# Patient Record
Sex: MALE | Race: WHITE | NOT HISPANIC OR LATINO | Employment: FULL TIME
[De-identification: names, ages, dates, MRNs, and addresses within clinical notes are randomized per-mention and may not be internally consistent; named-entity substitution may affect disease eponyms.]

---

## 2019-10-16 ENCOUNTER — TRANSCRIBE ORDERS (OUTPATIENT)
Dept: SCHEDULING | Age: 49
End: 2019-10-16

## 2019-10-16 DIAGNOSIS — E78.00 PURE HYPERCHOLESTEROLEMIA, UNSPECIFIED: Primary | ICD-10-CM

## 2019-10-23 ENCOUNTER — HOSPITAL ENCOUNTER (OUTPATIENT)
Dept: RADIOLOGY | Age: 49
Discharge: HOME | End: 2019-10-23
Attending: INTERNAL MEDICINE

## 2019-10-23 DIAGNOSIS — E78.00 PURE HYPERCHOLESTEROLEMIA, UNSPECIFIED: ICD-10-CM

## 2019-10-23 PROCEDURE — 75571 CT HRT W/O DYE W/CA TEST: CPT

## 2019-12-14 ENCOUNTER — EMERGENCY (EMERGENCY)
Facility: HOSPITAL | Age: 49
LOS: 1 days | Discharge: HOME | End: 2019-12-14
Attending: EMERGENCY MEDICINE
Payer: COMMERCIAL

## 2019-12-14 VITALS
OXYGEN SATURATION: 99 % | RESPIRATION RATE: 17 BRPM | HEART RATE: 95 BPM | WEIGHT: 139.99 LBS | SYSTOLIC BLOOD PRESSURE: 153 MMHG | DIASTOLIC BLOOD PRESSURE: 88 MMHG | TEMPERATURE: 98 F

## 2019-12-14 PROCEDURE — 99283 EMERGENCY DEPT VISIT LOW MDM: CPT

## 2019-12-14 PROCEDURE — 73140 X-RAY EXAM OF FINGER(S): CPT

## 2019-12-14 PROCEDURE — 90715 TDAP VACCINE 7 YRS/> IM: CPT

## 2019-12-14 PROCEDURE — 99283 EMERGENCY DEPT VISIT LOW MDM: CPT | Mod: 25

## 2019-12-14 PROCEDURE — 73140 X-RAY EXAM OF FINGER(S): CPT | Mod: 26,LT

## 2019-12-14 PROCEDURE — 90471 IMMUNIZATION ADMIN: CPT

## 2019-12-14 RX ORDER — IBUPROFEN 200 MG
600 TABLET ORAL ONCE
Refills: 0 | Status: COMPLETED | OUTPATIENT
Start: 2019-12-14 | End: 2019-12-14

## 2019-12-14 RX ORDER — CEPHALEXIN 500 MG
1 CAPSULE ORAL
Qty: 21 | Refills: 0
Start: 2019-12-14 | End: 2019-12-20

## 2019-12-14 RX ORDER — OXYCODONE HYDROCHLORIDE 5 MG/1
5 TABLET ORAL ONCE
Refills: 0 | Status: DISCONTINUED | OUTPATIENT
Start: 2019-12-14 | End: 2019-12-14

## 2019-12-14 RX ORDER — ACETAMINOPHEN 500 MG
975 TABLET ORAL ONCE
Refills: 0 | Status: COMPLETED | OUTPATIENT
Start: 2019-12-14 | End: 2019-12-14

## 2019-12-14 RX ORDER — TETANUS TOXOID, REDUCED DIPHTHERIA TOXOID AND ACELLULAR PERTUSSIS VACCINE, ADSORBED 5; 2.5; 8; 8; 2.5 [IU]/.5ML; [IU]/.5ML; UG/.5ML; UG/.5ML; UG/.5ML
0.5 SUSPENSION INTRAMUSCULAR ONCE
Refills: 0 | Status: COMPLETED | OUTPATIENT
Start: 2019-12-14 | End: 2019-12-14

## 2019-12-14 RX ADMIN — TETANUS TOXOID, REDUCED DIPHTHERIA TOXOID AND ACELLULAR PERTUSSIS VACCINE, ADSORBED 0.5 MILLILITER(S): 5; 2.5; 8; 8; 2.5 SUSPENSION INTRAMUSCULAR at 20:47

## 2019-12-14 RX ADMIN — Medication 600 MILLIGRAM(S): at 20:39

## 2019-12-14 RX ADMIN — Medication 975 MILLIGRAM(S): at 20:39

## 2019-12-14 RX ADMIN — Medication 1 TABLET(S): at 20:50

## 2019-12-14 NOTE — ED ADULT TRIAGE NOTE - CHIEF COMPLAINT QUOTE
Patient c/o left index finger pain and bleeding for 30 minutes. Patient cut his finger with a chair.

## 2019-12-14 NOTE — ED PROVIDER NOTE - OBJECTIVE STATEMENT
48yo male pt with PMHx of HLD, ambulatory c/o left non dominant index finger tip amputation s/p crushing injury this evening. Pt stated his finger was caught by a chair. Denies other injuries. Denies sensory changes or weakness to fingers. Denies fever, chills ot recent sickness. Unknown last Tdap.

## 2019-12-14 NOTE — ED PROVIDER NOTE - ATTENDING CONTRIBUTION TO CARE
------------ATTENDING NOTE------------  RHD pt w/ family c/o accidental injury to L index (2nd) fingertip, exam w/ simple fat pad amputation and radiograph w/ small tuft fx, no additional injuries, pt/family requesting hand for repair (vs BS dressing), nml VS at CT, in depth dw all about ddx, tx, barajas, continued close outpt fu.  - Randy Dvaid MD   ---------------------------------------------- ------------ATTENDING NOTE------------  RHD pt w/ family c/o accidental injury to L index (2nd) fingertip, exam w/ simple fat pad amputation and radiograph w/ small tuft fx, no additional injuries, pt/family requesting hand for repair (vs BS dressing and splint and close outpt Hand fu), nml VS at [place], in depth dw all about ddx, tx, barajas, continued close outpt fu.  - Randy David MD   ----------------------------------------------

## 2019-12-14 NOTE — ED PROVIDER NOTE - PATIENT PORTAL LINK FT
You can access the FollowMyHealth Patient Portal offered by [facility] by registering at the following website: [email or web address]. By joining [employer]’s FollowMyHealth portal, you will also be able to view your health information using other applications (apps) compatible with our system.

## 2019-12-14 NOTE — ED PROVIDER NOTE - NSFOLLOWUPINSTRUCTIONS_ED_ALL_ED_FT
See hand specialist Dr. Gonzalez as scheduled -- call to discuss.    Acetaminophen as directed for pain -- see medication warnings.  Ibuprofen as directed for pain--- see medication warnings.  Augmentin as directed.    See laceration information and return instructions given to you.    Seek immediate medical care for new/worsening symptoms/concerns. See hand specialist Dr. Gonzalez as scheduled -- call to discuss.    Acetaminophen as directed for pain -- see medication warnings.  Ibuprofen as directed for pain--- see medication warnings.  Keflex as directed.    See laceration information and return instructions given to you.    Seek immediate medical care for new/worsening symptoms/concerns.

## 2019-12-14 NOTE — ED PROVIDER NOTE - PHYSICAL EXAMINATION
NAD, VSS, Afebrile, Lungs clear. + Left 2nd phalanx; tuft amputation without active bleeding. N/V- intact.

## 2019-12-14 NOTE — ED PROVIDER NOTE - CARE PROVIDER_API CALL
Eulogio Gonzalez)  Plastic Surgery  [address]  [place] [address]  Phone: [phone]  Fax: [phone]  Follow Up Time:

## 2019-12-14 NOTE — ED ADULT NURSE NOTE - OBJECTIVE STATEMENT
49 y.o M presents to the ED for finger injury. Patient is awake, alert and speaking coherently. As per patient he was at a restaurant and reached underneath a chair and cut a piece of his left pointer finger off. Patient presents with gauze wrapped around finger, actively bleeding. Finger tip that was cut off is in cup of ice water at the bedside. Patient reports anxiety and discomfort- pain rated 7/10.

## 2023-06-12 ENCOUNTER — APPOINTMENT (EMERGENCY)
Dept: RADIOLOGY | Facility: HOSPITAL | Age: 53
End: 2023-06-12
Payer: COMMERCIAL

## 2023-06-12 ENCOUNTER — HOSPITAL ENCOUNTER (EMERGENCY)
Facility: HOSPITAL | Age: 53
Discharge: HOME | End: 2023-06-13
Attending: STUDENT IN AN ORGANIZED HEALTH CARE EDUCATION/TRAINING PROGRAM
Payer: COMMERCIAL

## 2023-06-12 DIAGNOSIS — N20.1 CALCULUS OF URETEROVESICAL JUNCTION (UVJ): Primary | ICD-10-CM

## 2023-06-12 LAB
ALBUMIN SERPL-MCNC: 4.3 G/DL (ref 3.4–5)
ALP SERPL-CCNC: 95 IU/L (ref 35–126)
ALT SERPL-CCNC: 29 IU/L (ref 16–63)
ANION GAP SERPL CALC-SCNC: 8 MEQ/L (ref 3–15)
AST SERPL-CCNC: 25 IU/L (ref 15–41)
BASOPHILS # BLD: 0.04 K/UL (ref 0.01–0.1)
BASOPHILS NFR BLD: 0.3 %
BILIRUB SERPL-MCNC: 0.8 MG/DL (ref 0.3–1.2)
BUN SERPL-MCNC: 20 MG/DL (ref 8–20)
CALCIUM SERPL-MCNC: 9.2 MG/DL (ref 8.9–10.3)
CHLORIDE SERPL-SCNC: 104 MEQ/L (ref 98–109)
CO2 SERPL-SCNC: 26 MEQ/L (ref 22–32)
CREAT SERPL-MCNC: 1.1 MG/DL (ref 0.8–1.3)
DIFFERENTIAL METHOD BLD: ABNORMAL
EOSINOPHIL # BLD: 0.13 K/UL (ref 0.04–0.54)
EOSINOPHIL NFR BLD: 0.9 %
ERYTHROCYTE [DISTWIDTH] IN BLOOD BY AUTOMATED COUNT: 12.8 % (ref 11.6–14.4)
GFR SERPL CREATININE-BSD FRML MDRD: >60 ML/MIN/1.73M*2
GLUCOSE SERPL-MCNC: 149 MG/DL (ref 70–99)
HCT VFR BLDCO AUTO: 42.9 % (ref 40.1–51)
HGB BLD-MCNC: 14.1 G/DL (ref 13.7–17.5)
IMM GRANULOCYTES # BLD AUTO: 0.07 K/UL (ref 0–0.08)
IMM GRANULOCYTES NFR BLD AUTO: 0.5 %
LIPASE SERPL-CCNC: 32 U/L (ref 20–51)
LYMPHOCYTES # BLD: 1.96 K/UL (ref 1.2–3.5)
LYMPHOCYTES NFR BLD: 13.4 %
MCH RBC QN AUTO: 28.7 PG (ref 28–33.2)
MCHC RBC AUTO-ENTMCNC: 32.9 G/DL (ref 32.2–36.5)
MCV RBC AUTO: 87.4 FL (ref 83–98)
MONOCYTES # BLD: 0.84 K/UL (ref 0.3–1)
MONOCYTES NFR BLD: 5.7 %
NEUTROPHILS # BLD: 11.6 K/UL (ref 1.7–7)
NEUTS SEG NFR BLD: 79.2 %
NRBC BLD-RTO: 0 %
PDW BLD AUTO: 10.6 FL (ref 9.4–12.4)
PLATELET # BLD AUTO: 271 K/UL (ref 150–350)
POTASSIUM SERPL-SCNC: 4.4 MEQ/L (ref 3.6–5.1)
PROT SERPL-MCNC: 7.9 G/DL (ref 6–8.2)
RBC # BLD AUTO: 4.91 M/UL (ref 4.5–5.8)
SODIUM SERPL-SCNC: 138 MEQ/L (ref 136–144)
WBC # BLD AUTO: 14.64 K/UL (ref 3.8–10.5)

## 2023-06-12 PROCEDURE — 85025 COMPLETE CBC W/AUTO DIFF WBC: CPT | Performed by: STUDENT IN AN ORGANIZED HEALTH CARE EDUCATION/TRAINING PROGRAM

## 2023-06-12 PROCEDURE — 63600105 HC IODINE BASED CONTRAST: Performed by: PHYSICIAN ASSISTANT

## 2023-06-12 PROCEDURE — 96374 THER/PROPH/DIAG INJ IV PUSH: CPT | Mod: 59

## 2023-06-12 PROCEDURE — 80053 COMPREHEN METABOLIC PANEL: CPT

## 2023-06-12 PROCEDURE — 85025 COMPLETE CBC W/AUTO DIFF WBC: CPT

## 2023-06-12 PROCEDURE — 25800000 HC PHARMACY IV SOLUTIONS: Performed by: PHYSICIAN ASSISTANT

## 2023-06-12 PROCEDURE — 74177 CT ABD & PELVIS W/CONTRAST: CPT | Mod: ME

## 2023-06-12 PROCEDURE — 3E0333Z INTRODUCTION OF ANTI-INFLAMMATORY INTO PERIPHERAL VEIN, PERCUTANEOUS APPROACH: ICD-10-PCS | Performed by: STUDENT IN AN ORGANIZED HEALTH CARE EDUCATION/TRAINING PROGRAM

## 2023-06-12 PROCEDURE — 83690 ASSAY OF LIPASE: CPT | Performed by: STUDENT IN AN ORGANIZED HEALTH CARE EDUCATION/TRAINING PROGRAM

## 2023-06-12 PROCEDURE — 99284 EMERGENCY DEPT VISIT MOD MDM: CPT | Mod: 25

## 2023-06-12 PROCEDURE — G1004 CDSM NDSC: HCPCS

## 2023-06-12 PROCEDURE — 81001 URINALYSIS AUTO W/SCOPE: CPT | Performed by: STUDENT IN AN ORGANIZED HEALTH CARE EDUCATION/TRAINING PROGRAM

## 2023-06-12 PROCEDURE — 63600000 HC DRUGS/DETAIL CODE: Performed by: STUDENT IN AN ORGANIZED HEALTH CARE EDUCATION/TRAINING PROGRAM

## 2023-06-12 PROCEDURE — 3E0337Z INTRODUCTION OF ELECTROLYTIC AND WATER BALANCE SUBSTANCE INTO PERIPHERAL VEIN, PERCUTANEOUS APPROACH: ICD-10-PCS | Performed by: STUDENT IN AN ORGANIZED HEALTH CARE EDUCATION/TRAINING PROGRAM

## 2023-06-12 PROCEDURE — 80053 COMPREHEN METABOLIC PANEL: CPT | Performed by: STUDENT IN AN ORGANIZED HEALTH CARE EDUCATION/TRAINING PROGRAM

## 2023-06-12 PROCEDURE — 36415 COLL VENOUS BLD VENIPUNCTURE: CPT

## 2023-06-12 PROCEDURE — 83690 ASSAY OF LIPASE: CPT

## 2023-06-12 PROCEDURE — 96361 HYDRATE IV INFUSION ADD-ON: CPT

## 2023-06-12 RX ORDER — ESCITALOPRAM OXALATE 10 MG/1
10 TABLET ORAL DAILY
COMMUNITY

## 2023-06-12 RX ORDER — KETOROLAC TROMETHAMINE 15 MG/ML
15 INJECTION, SOLUTION INTRAMUSCULAR; INTRAVENOUS ONCE
Status: COMPLETED | OUTPATIENT
Start: 2023-06-12 | End: 2023-06-12

## 2023-06-12 RX ORDER — ROSUVASTATIN CALCIUM 20 MG/1
20 TABLET, COATED ORAL DAILY
COMMUNITY

## 2023-06-12 RX ADMIN — IOHEXOL 100 ML: 350 INJECTION, SOLUTION INTRAVENOUS at 22:26

## 2023-06-12 RX ADMIN — KETOROLAC TROMETHAMINE 15 MG: 15 INJECTION, SOLUTION INTRAMUSCULAR; INTRAVENOUS at 23:07

## 2023-06-12 RX ADMIN — SODIUM CHLORIDE 1000 ML: 9 INJECTION, SOLUTION INTRAVENOUS at 23:07

## 2023-06-12 ASSESSMENT — ENCOUNTER SYMPTOMS
ABDOMINAL PAIN: 1
DIZZINESS: 0
DIAPHORESIS: 0
FREQUENCY: 0
DIARRHEA: 0
FLANK PAIN: 1
LIGHT-HEADEDNESS: 0
CHILLS: 0
SHORTNESS OF BREATH: 0
DYSURIA: 0
HEMATURIA: 0
NAUSEA: 0
FEVER: 0
VOMITING: 0
ABDOMINAL DISTENTION: 0
DIFFICULTY URINATING: 0

## 2023-06-13 VITALS
OXYGEN SATURATION: 98 % | DIASTOLIC BLOOD PRESSURE: 77 MMHG | TEMPERATURE: 97.3 F | BODY MASS INDEX: 24.99 KG/M2 | RESPIRATION RATE: 16 BRPM | SYSTOLIC BLOOD PRESSURE: 141 MMHG | HEIGHT: 65 IN | WEIGHT: 150 LBS | HEART RATE: 64 BPM

## 2023-06-13 LAB
BACTERIA URNS QL MICRO: ABNORMAL /HPF
BILIRUB UR QL STRIP.AUTO: NEGATIVE MG/DL
CLARITY UR REFRACT.AUTO: CLEAR
COLOR UR AUTO: YELLOW
GLUCOSE UR STRIP.AUTO-MCNC: NEGATIVE MG/DL
HGB UR QL STRIP.AUTO: 1
HYALINE CASTS #/AREA URNS LPF: ABNORMAL /LPF
KETONES UR STRIP.AUTO-MCNC: NEGATIVE MG/DL
LEUKOCYTE ESTERASE UR QL STRIP.AUTO: NEGATIVE
MUCOUS THREADS URNS QL MICRO: ABNORMAL /LPF
NITRITE UR QL STRIP.AUTO: NEGATIVE
PH UR STRIP.AUTO: 6 [PH]
PROT UR QL STRIP.AUTO: 1
RBC #/AREA URNS HPF: ABNORMAL /HPF
SP GR UR REFRACT.AUTO: >1.035
SQUAMOUS URNS QL MICRO: ABNORMAL /HPF
UROBILINOGEN UR STRIP-ACNC: 0.2 EU/DL
WBC #/AREA URNS HPF: ABNORMAL /HPF

## 2023-06-13 RX ORDER — OXYCODONE AND ACETAMINOPHEN 5; 325 MG/1; MG/1
1 TABLET ORAL EVERY 4 HOURS PRN
Qty: 8 TABLET | Refills: 0 | Status: SHIPPED | OUTPATIENT
Start: 2023-06-13 | End: 2023-06-17 | Stop reason: HOSPADM

## 2023-06-13 RX ORDER — ONDANSETRON 4 MG/1
4 TABLET, FILM COATED ORAL EVERY 8 HOURS PRN
Qty: 12 TABLET | Refills: 0 | Status: SHIPPED | OUTPATIENT
Start: 2023-06-13 | End: 2023-06-17 | Stop reason: HOSPADM

## 2023-06-13 NOTE — ED ATTESTATION NOTE
"I have personally seen and examined Tian Alas.  I was involved in the care, management and medical decision making for this patient.  I reviewed and agree with physician assistant (ANNE) assessment and plan of care; we discussed the case and the treatment plan. Any exceptions are documented below.    My focused history, examination, assessment and plan of care of Tian Alas is as follows:  Brief History:  52M with left sided abdominal pain since Saturday; states intermittent. Left side/flank. Back and anterior abdomen. Today more persistent. No h/o similar.          has a past medical history of Lipid disorder.   has no past surgical history on file.  Focused Physical Exam:  Patient Vitals for the past 72 hrs:   BP Temp Pulse Resp SpO2 Height Weight   06/12/23 2055 (!) 164/94 -- -- -- -- -- --   06/12/23 2052 (!) 181/105 36.3 °C (97.3 °F) 74 16 95 % 1.651 m (5' 5\") 68 kg (150 lb)     Physical Exam  Vitals and nursing note reviewed. Exam conducted with a chaperone present (spouse).   Constitutional:       General: He is in acute distress (Left flank pain, unable to sit still, pacing in room).      Appearance: Normal appearance. He is normal weight.   HENT:      Head: Normocephalic and atraumatic.      Right Ear: External ear normal.      Left Ear: External ear normal.      Nose: Nose normal.      Mouth/Throat:      Mouth: Mucous membranes are moist.      Pharynx: Oropharynx is clear.   Eyes:      Extraocular Movements: Extraocular movements intact.      Conjunctiva/sclera: Conjunctivae normal.   Pulmonary:      Effort: Pulmonary effort is normal.   Abdominal:      Comments: Subjective left CVA/flank/abdominal pain   Musculoskeletal:         General: Normal range of motion.      Cervical back: Normal range of motion. No rigidity.   Skin:     General: Skin is warm and dry.   Neurological:      General: No focal deficit present.      Mental Status: He is alert. Mental status is at baseline. "   Psychiatric:         Mood and Affect: Mood normal.         Behavior: Behavior normal.       Assessment / Plan / MDM:  Medical Decision Making  52M with left flank pain since Saturday; intermittent now persistent.   Ddx: kidney stone, colitis, diverticulitis, muscular  Plan: labs, UA, CT. CT demonstrates distal ureteral stone. Sx/supportive care. Pending UA.     Amount and/or Complexity of Data Reviewed  Labs: ordered. Decision-making details documented in ED Course.  Radiology: ordered and independent interpretation performed.      Risk  Prescription drug management.        I was physically present for the key/critical portions of the following procedures:  Procedures            Vasile Schmitt DO  06/13/23 6791

## 2023-06-13 NOTE — ED PROVIDER NOTES
Emergency Medicine Note  HPI   HISTORY OF PRESENT ILLNESS       History provided by:  Patient  51 y/o male with PMH of HLD presents to the ED with complaint of LLQ abdominal pain since Saturday. Pain suddenly came on and has been intermittent with intense episodes that takes his breath away. Pain is localized to LLQ but sometimes he feels a dull pain in his left flank. Denies any back trauma, hematuria, dysuria, frequency or urinary sxs. Pain is not worse with movement. He can't find a comfortable position. Denies any CP, SOB, d/l, N/V/D, fever or chills. Saw his PCP today who thought it may be a left inguinal hernia but the pain has got worse and persisted which prompted the patient to come to the ER for further evaluation.      Patient History   PAST HISTORY     Reviewed from Nursing Triage:       Past Medical History:   Diagnosis Date   • Lipid disorder        History reviewed. No pertinent surgical history.    History reviewed. No pertinent family history.    Social History     Tobacco Use   • Smoking status: Never   • Smokeless tobacco: Never   Substance Use Topics   • Alcohol use: Yes   • Drug use: Never         Review of Systems   REVIEW OF SYSTEMS     Review of Systems   Constitutional: Negative for chills, diaphoresis and fever.   Respiratory: Negative for shortness of breath.    Cardiovascular: Negative for chest pain.   Gastrointestinal: Positive for abdominal pain (LLQ). Negative for abdominal distention, diarrhea, nausea and vomiting.   Genitourinary: Positive for flank pain (left). Negative for difficulty urinating, dysuria, frequency, hematuria and urgency.   Neurological: Negative for dizziness and light-headedness.         VITALS     ED Vitals    Date/Time Temp Pulse Resp BP SpO2 Who   06/13/23 0005 -- 64 16 141/77 98 % AW   06/12/23 2337 -- -- -- 155/82 -- TJM   06/12/23 2309 -- 68 20 186/105 96 % CMP   06/12/23 2055 -- -- -- 164/94 -- EKG   06/12/23 2052 36.3 °C (97.3 °F) 74 16 181/105 95 % EKG         Pulse Ox %: 95 % (06/12/23 2152)  Pulse Ox Interpretation: Normal (06/12/23 2152)           Physical Exam   PHYSICAL EXAM     Physical Exam  Vitals (mildly hypertensive) and nursing note reviewed.   Constitutional:       General: He is not in acute distress.     Appearance: He is not ill-appearing or diaphoretic.   HENT:      Head: Normocephalic and atraumatic.   Cardiovascular:      Rate and Rhythm: Normal rate and regular rhythm.      Pulses: Normal pulses.      Heart sounds: Normal heart sounds.   Pulmonary:      Effort: Pulmonary effort is normal. No respiratory distress.      Breath sounds: Normal breath sounds.   Abdominal:      General: Abdomen is flat. There is no distension.      Palpations: Abdomen is soft.      Tenderness: There is abdominal tenderness in the left lower quadrant. There is left CVA tenderness (minimal). There is no right CVA tenderness, guarding or rebound. Negative signs include Otto's sign.      Hernia: There is no hernia in the left inguinal area or right inguinal area.   Musculoskeletal:         General: Normal range of motion.   Skin:     General: Skin is warm and dry.   Neurological:      Mental Status: He is alert.      Cranial Nerves: No cranial nerve deficit.      Comments: Moving all four extremities purposefully           PROCEDURES     Procedures     DATA     Results     Procedure Component Value Units Date/Time    Urinalysis with Reflex Culture [[id number]]  (Abnormal) Collected: 06/12/23 2325    Specimen: Urine, Clean Catch Updated: 06/13/23 0021    Narrative:      The following orders were created for panel order Urinalysis with Reflex Culture.  Procedure                               Abnormality         Status                     ---------                               -----------         ------                     UA Reflex to Culture (Mac...[[id number]]  Abnormal            Final result               UA Microscopic[[id number]]               Abnormal            Final  result                 Please view results for these tests on the individual orders.    UA Microscopic [[id number]]  (Abnormal) Collected: 06/12/23 2325    Specimen: Urine, Clean Catch Updated: 06/13/23 0021     RBC, Urine 10 TO 19 /HPF      WBC, Urine 0 TO 3 /HPF      Squamous Epithelial None Seen /hpf      Hyaline Cast None Seen /lpf      Bacteria, Urine None Seen /HPF      Mucus Rare /LPF     UA Reflex to Culture (Macroscopic) [[id number]]  (Abnormal) Collected: 06/12/23 2325    Specimen: Urine, Clean Catch Updated: 06/13/23 0020     Color, Urine Yellow     Clarity, Urine Clear     Specific Gravity, Urine >1.035     pH, Urine 6.0     Leukocyte Esterase Negative     Comment: Results can be falsely negative due to high specific gravity, some antibiotics, glucose >3 g/dl, or WBC other than neutrophils.        Nitrite, Urine Negative     Protein, Urine +1     Glucose, Urine Negative mg/dL      Ketones, Urine Negative mg/dL      Urobilinogen, Urine 0.2 EU/dL      Bilirubin, Urine Negative mg/dL      Blood, Urine +1     Comment: The sensitivity of the occult blood test is equivalent to approximately 4 intact RBC/HPF.       Comprehensive metabolic panel [[id number]]  (Abnormal) Collected: 06/12/23 2058    Specimen: Blood, Venous Updated: 06/12/23 2132     Sodium 138 mEQ/L      Potassium 4.4 mEQ/L      Comment: Results obtained on plasma. Plasma Potassium values may be up to 0.4 mEQ/L less than serum values. The differences may be greater for patients with high platelet or white cell counts.        Chloride 104 mEQ/L      CO2 26 mEQ/L      BUN 20 mg/dL      Creatinine 1.1 mg/dL      Glucose 149 mg/dL      Calcium 9.2 mg/dL      AST (SGOT) 25 IU/L      ALT (SGPT) 29 IU/L      Alkaline Phosphatase 95 IU/L      Total Protein 7.9 g/dL      Comment: Test performed on plasma which typically contains approximately 0.4 g/dL more protein than serum.        Albumin 4.3 g/dL      Bilirubin, Total 0.8 mg/dL      eGFR >60.0 mL/min/1.73m*2       Anion Gap 8 mEQ/L     Narrative:      Order only if pain is above umbilicus    Lipase, if pain is above umbilicus [[id number]]  (Normal) Collected: 06/12/23 2058    Specimen: Blood, Venous Updated: 06/12/23 2132     Lipase 32 U/L     Narrative:      Order only if pain is above umbilicus    CBC and differential [[id number]]  (Abnormal) Collected: 06/12/23 2058    Specimen: Blood, Venous Updated: 06/12/23 2106     WBC 14.64 K/uL      RBC 4.91 M/uL      Hemoglobin 14.1 g/dL      Hematocrit 42.9 %      MCV 87.4 fL      MCH 28.7 pg      MCHC 32.9 g/dL      RDW 12.8 %      Platelets 271 K/uL      MPV 10.6 fL      Differential Type Auto     nRBC 0.0 %      Immature Granulocytes 0.5 %      Neutrophils 79.2 %      Lymphocytes 13.4 %      Monocytes 5.7 %      Eosinophils 0.9 %      Basophils 0.3 %      Immature Granulocytes, Absolute 0.07 K/uL      Neutrophils, Absolute 11.60 K/uL      Lymphocytes, Absolute 1.96 K/uL      Monocytes, Absolute 0.84 K/uL      Eosinophils, Absolute 0.13 K/uL      Basophils, Absolute 0.04 K/uL           Imaging Results          CT ABDOMEN PELVIS WITH IV CONTRAST (Final result)  Result time 06/13/23 07:50:23    Final result                 Impression:    IMPRESSION:  1. The kidneys take up and excrete contrast asymmetrically due to a delayed left  nephrogram with mild left hydronephrosis and perinephric fat stranding.  2. The left ureter is dilated down to level of 4 mm stone at the  ureterovesicular junction. No right sided stones are observed. The right ureter  is normal in caliber.  3. Spondylolysis at L5 bilaterally and moderate anterolisthesis of L5 on S1.    Preliminary report by: Nicola Lozada MD, Jun 12, 2023 11:22 PM                 Narrative:    CLINICAL HISTORY: Left lower quadrant abdominal pain.    COMMENT: CT examination of the abdomen and pelvis was performed using contiguous  2.5 mm transaxial sections.  Reformats were made in the coronal and sagittal  planes by the  technologist. Images were acquired after the uneventful  administration of 100 cc of Omnipaque 350 intravenously.    CT DOSE:  One or more dose reduction techniques (e.g. automated exposure  control, adjustment of the mA and/or kV according to patient size, use of  iterative reconstruction technique) utilized for this examination.    Comparison studies: None.    Lung bases: Dependent hypoventilatory changes bilaterally.    Lower chest soft tissue: Small hiatal hernia.    Liver: Right posterior hepatic segment hemangioma. Left lateral hepatic segment  hemangioma. Subcentimeter cyst adjacent to the gallbladder fossa.    Spleen: Unremarkable.    Adrenals: Unremarkable.    Pancreas: Unremarkable.    Kidneys: 8 mm anterior lower pole right renal cyst. Moderate left hydronephrosis  and hydroureter. The left ureter is dilated to the level of a 5 mm calculus,  located approximately 8 mm proximal to the left ureterovesical junction at the  level of the left superior acetabulum. There is mild perinephric and  periureteric fat stranding. See impression.    Gallbladder:  Unremarkable.    Retroperitoneal structures: Aortoiliac atherosclerotic calcification.    Bowel and mesentery: Unremarkable.    Ascites: None.    Lymph nodes: None enlarged.    Reproductive Organs: Unremarkable.    Bladder: Unremarkable.    Bones: Bilateral L5 pars interarticularis defects. No suspicious osteolytic or  osteoblastic lesions. Endplate irregularity and degenerative endplate sclerosis  is seen bordering L5-S1.                    Preliminary Interpretation      Patient Name: tyrone ireland  Exam(s): a/p standard CT  MR#: [id number]    History: llq pain    Preliminary Impression:    No prior exam is currently available for comparison. The kidneys take up and excrete contrast asymmetrically due to a delayed left nephrogram with mild left hydronephrosis and perinephric fat stranding. The left ureter is dilated down to level of 4 mm stone at the  ureterovesicular junction. No right sided stones are observed. The right ureter is normal in caliber. The urinary bladder is normal in wall thickness and capacity. Normal size prostate gland.    Additional findings: No evidence of bowel obstruction, free air or fluid. No bowel wall thickening, dilatation or surrounding fat stranding. No retroperitoneal adenopathy. Normal-appearing appendix.    Bibasilar atelectasis. The heart is not enlarged. No effusions are present.    17 mm medial right hepatic hypodensity with nodular enhancement consistent with a hemangioma Otherwise, normal solid viscera. The ga  llbladder is partially distended and normal in wall thickness without calcified stones. No biliary dilatation present.    The osseous structures are remarkable for spondylolysis at L5 bilaterally and grade 2 spondylolisthesis of L5 over S1. The aorta is normal in caliber without significant calcification.      Interpreted by: Nicola Lozada MD, Jun 12, 2023 11:22 PM                              No orders to display       Scoring tools                                  ED Course & MDM   MDM / ED COURSE / CLINICAL IMPRESSION / DISPO     MDM    ED Course as of 06/16/23 1351   Mon Jun 12, 2023 2207 WBC(!): 14.64  Mild leukocytosis [TM]   2207 Comprehensive metabolic panel(!)  Unremarkable [TM]   2207 Lipase: 32  normal [TM]   2258 CT independently reviewed by me; left sided UPJ stone.  [NS]   2300 Pt seen and evaluated and reviewed CT with pt and spouse. Suspect distal left ureteral stone; pending official radiology report. Toradol ordered. Pt drinking to try and provide urine sample. Reassess.    [NS]   2328 4mm left stone at UVJ with mild left hydronephrosis on CT. Pending UA [TM]   Tue Jun 13, 2023   0026 RBC, Urine(!): 10 TO 19 [TM]   0026 UA without signs of infected stone [TM]   0036 Patient's pain has been controlled while here in ER.  Plan for disposition will be to discharge home with follow-up with urologist  and PCP.  Patient given strainer and Rx for Zofran and Percocet. Return precautions given. Pt is agreeable to the above plan. [TM]      ED Course User Index  [NS] Vasile Schmitt,   [TM] Too De La Torre PA C     Clinical Impression      Calculus of ureterovesical junction (UVJ)     _________________     ED Disposition   Discharge                   Too De La Torre PA C  06/12/23 3748       Too De La Torre PA C  06/13/23 0037       Too De La Torre PA C  06/16/23 3734

## 2023-06-15 ENCOUNTER — HOSPITAL ENCOUNTER (OUTPATIENT)
Facility: HOSPITAL | Age: 53
Discharge: HOME | End: 2023-06-17
Attending: EMERGENCY MEDICINE | Admitting: UROLOGY
Payer: COMMERCIAL

## 2023-06-15 DIAGNOSIS — N23 RENAL COLIC: ICD-10-CM

## 2023-06-15 DIAGNOSIS — N20.1 LEFT URETERAL CALCULUS: Primary | ICD-10-CM

## 2023-06-15 LAB
ALBUMIN SERPL-MCNC: 4 G/DL (ref 3.4–5)
ALP SERPL-CCNC: 86 IU/L (ref 35–126)
ALT SERPL-CCNC: 21 IU/L (ref 16–63)
ANION GAP SERPL CALC-SCNC: 6 MEQ/L (ref 3–15)
AST SERPL-CCNC: 23 IU/L (ref 15–41)
BACTERIA URNS QL MICRO: ABNORMAL /HPF
BASOPHILS # BLD: 0.03 K/UL (ref 0.01–0.1)
BASOPHILS NFR BLD: 0.3 %
BILIRUB SERPL-MCNC: 0.8 MG/DL (ref 0.3–1.2)
BILIRUB UR QL STRIP.AUTO: NEGATIVE MG/DL
BUN SERPL-MCNC: 23 MG/DL (ref 8–20)
CALCIUM SERPL-MCNC: 9.1 MG/DL (ref 8.9–10.3)
CHLORIDE SERPL-SCNC: 103 MEQ/L (ref 98–109)
CLARITY UR REFRACT.AUTO: CLEAR
CO2 SERPL-SCNC: 27 MEQ/L (ref 22–32)
COLOR UR AUTO: YELLOW
CREAT SERPL-MCNC: 1.7 MG/DL (ref 0.8–1.3)
DIFFERENTIAL METHOD BLD: NORMAL
EOSINOPHIL # BLD: 0.22 K/UL (ref 0.04–0.54)
EOSINOPHIL NFR BLD: 2.2 %
ERYTHROCYTE [DISTWIDTH] IN BLOOD BY AUTOMATED COUNT: 12.6 % (ref 11.6–14.4)
GFR SERPL CREATININE-BSD FRML MDRD: 42.7 ML/MIN/1.73M*2
GLUCOSE SERPL-MCNC: 118 MG/DL (ref 70–99)
GLUCOSE UR STRIP.AUTO-MCNC: NEGATIVE MG/DL
HCT VFR BLDCO AUTO: 44.5 % (ref 40.1–51)
HGB BLD-MCNC: 14.5 G/DL (ref 13.7–17.5)
HGB UR QL STRIP.AUTO: 1
HYALINE CASTS #/AREA URNS LPF: ABNORMAL /LPF
IMM GRANULOCYTES # BLD AUTO: 0.03 K/UL (ref 0–0.08)
IMM GRANULOCYTES NFR BLD AUTO: 0.3 %
KETONES UR STRIP.AUTO-MCNC: NEGATIVE MG/DL
LEUKOCYTE ESTERASE UR QL STRIP.AUTO: NEGATIVE
LIPASE SERPL-CCNC: 29 U/L (ref 20–51)
LYMPHOCYTES # BLD: 1.73 K/UL (ref 1.2–3.5)
LYMPHOCYTES NFR BLD: 17.5 %
MCH RBC QN AUTO: 28.2 PG (ref 28–33.2)
MCHC RBC AUTO-ENTMCNC: 32.6 G/DL (ref 32.2–36.5)
MCV RBC AUTO: 86.6 FL (ref 83–98)
MONOCYTES # BLD: 0.87 K/UL (ref 0.3–1)
MONOCYTES NFR BLD: 8.8 %
MUCOUS THREADS URNS QL MICRO: ABNORMAL /LPF
NEUTROPHILS # BLD: 7 K/UL (ref 1.7–7)
NEUTS SEG NFR BLD: 70.9 %
NITRITE UR QL STRIP.AUTO: NEGATIVE
NRBC BLD-RTO: 0 %
PDW BLD AUTO: 10.4 FL (ref 9.4–12.4)
PH UR STRIP.AUTO: 6 [PH]
PLATELET # BLD AUTO: 266 K/UL (ref 150–350)
POTASSIUM SERPL-SCNC: 4.2 MEQ/L (ref 3.6–5.1)
PROT SERPL-MCNC: 7.8 G/DL (ref 6–8.2)
PROT UR QL STRIP.AUTO: NEGATIVE
RBC # BLD AUTO: 5.14 M/UL (ref 4.5–5.8)
RBC #/AREA URNS HPF: ABNORMAL /HPF
SODIUM SERPL-SCNC: 136 MEQ/L (ref 136–144)
SP GR UR REFRACT.AUTO: 1.01
SQUAMOUS URNS QL MICRO: ABNORMAL /HPF
UROBILINOGEN UR STRIP-ACNC: 0.2 EU/DL
WBC # BLD AUTO: 9.88 K/UL (ref 3.8–10.5)
WBC #/AREA URNS HPF: ABNORMAL /HPF

## 2023-06-15 PROCEDURE — 85025 COMPLETE CBC W/AUTO DIFF WBC: CPT | Performed by: EMERGENCY MEDICINE

## 2023-06-15 PROCEDURE — 25800000 HC PHARMACY IV SOLUTIONS: Performed by: UROLOGY

## 2023-06-15 PROCEDURE — 3E033GC INTRODUCTION OF OTHER THERAPEUTIC SUBSTANCE INTO PERIPHERAL VEIN, PERCUTANEOUS APPROACH: ICD-10-PCS | Performed by: EMERGENCY MEDICINE

## 2023-06-15 PROCEDURE — 83690 ASSAY OF LIPASE: CPT | Performed by: EMERGENCY MEDICINE

## 2023-06-15 PROCEDURE — 96361 HYDRATE IV INFUSION ADD-ON: CPT

## 2023-06-15 PROCEDURE — 63700000 HC SELF-ADMINISTRABLE DRUG: Performed by: UROLOGY

## 2023-06-15 PROCEDURE — 96374 THER/PROPH/DIAG INJ IV PUSH: CPT | Mod: 59

## 2023-06-15 PROCEDURE — 25800000 HC PHARMACY IV SOLUTIONS: Performed by: EMERGENCY MEDICINE

## 2023-06-15 PROCEDURE — 63600000 HC DRUGS/DETAIL CODE: Performed by: EMERGENCY MEDICINE

## 2023-06-15 PROCEDURE — 3E0337Z INTRODUCTION OF ELECTROLYTIC AND WATER BALANCE SUBSTANCE INTO PERIPHERAL VEIN, PERCUTANEOUS APPROACH: ICD-10-PCS | Performed by: EMERGENCY MEDICINE

## 2023-06-15 PROCEDURE — 81001 URINALYSIS AUTO W/SCOPE: CPT | Performed by: EMERGENCY MEDICINE

## 2023-06-15 PROCEDURE — 63700000 HC SELF-ADMINISTRABLE DRUG: Performed by: STUDENT IN AN ORGANIZED HEALTH CARE EDUCATION/TRAINING PROGRAM

## 2023-06-15 PROCEDURE — 99285 EMERGENCY DEPT VISIT HI MDM: CPT | Mod: 25

## 2023-06-15 PROCEDURE — 80053 COMPREHEN METABOLIC PANEL: CPT | Performed by: EMERGENCY MEDICINE

## 2023-06-15 PROCEDURE — 63600000 HC DRUGS/DETAIL CODE: Mod: JZ | Performed by: UROLOGY

## 2023-06-15 PROCEDURE — 81003 URINALYSIS AUTO W/O SCOPE: CPT | Performed by: EMERGENCY MEDICINE

## 2023-06-15 PROCEDURE — 36415 COLL VENOUS BLD VENIPUNCTURE: CPT | Performed by: EMERGENCY MEDICINE

## 2023-06-15 RX ORDER — TAMSULOSIN HYDROCHLORIDE 0.4 MG/1
0.4 CAPSULE ORAL NIGHTLY
Status: DISCONTINUED | OUTPATIENT
Start: 2023-06-15 | End: 2023-06-15

## 2023-06-15 RX ORDER — KETOROLAC TROMETHAMINE 15 MG/ML
15 INJECTION, SOLUTION INTRAMUSCULAR; INTRAVENOUS ONCE
Status: COMPLETED | OUTPATIENT
Start: 2023-06-15 | End: 2023-06-15

## 2023-06-15 RX ORDER — IBUPROFEN 200 MG
16-32 TABLET ORAL AS NEEDED
Status: DISCONTINUED | OUTPATIENT
Start: 2023-06-15 | End: 2023-06-17 | Stop reason: HOSPADM

## 2023-06-15 RX ORDER — ONDANSETRON HYDROCHLORIDE 2 MG/ML
4 INJECTION, SOLUTION INTRAVENOUS EVERY 8 HOURS PRN
Status: DISCONTINUED | OUTPATIENT
Start: 2023-06-15 | End: 2023-06-17 | Stop reason: HOSPADM

## 2023-06-15 RX ORDER — TAMSULOSIN HYDROCHLORIDE 0.4 MG/1
0.4 CAPSULE ORAL NIGHTLY
Status: DISCONTINUED | OUTPATIENT
Start: 2023-06-15 | End: 2023-06-16

## 2023-06-15 RX ORDER — DEXTROSE 40 %
15-30 GEL (GRAM) ORAL AS NEEDED
Status: DISCONTINUED | OUTPATIENT
Start: 2023-06-15 | End: 2023-06-17 | Stop reason: HOSPADM

## 2023-06-15 RX ORDER — POLYETHYLENE GLYCOL 3350 17 G/17G
17 POWDER, FOR SOLUTION ORAL DAILY
Status: DISCONTINUED | OUTPATIENT
Start: 2023-06-15 | End: 2023-06-17 | Stop reason: HOSPADM

## 2023-06-15 RX ORDER — HYDROMORPHONE HYDROCHLORIDE 1 MG/ML
0.5 INJECTION, SOLUTION INTRAMUSCULAR; INTRAVENOUS; SUBCUTANEOUS ONCE
Status: DISPENSED | OUTPATIENT
Start: 2023-06-15 | End: 2023-06-15

## 2023-06-15 RX ORDER — DEXTROSE 50 % IN WATER (D50W) INTRAVENOUS SYRINGE
25 AS NEEDED
Status: DISCONTINUED | OUTPATIENT
Start: 2023-06-15 | End: 2023-06-17 | Stop reason: HOSPADM

## 2023-06-15 RX ORDER — OXYCODONE HYDROCHLORIDE 5 MG/1
5 TABLET ORAL EVERY 4 HOURS PRN
Status: DISCONTINUED | OUTPATIENT
Start: 2023-06-15 | End: 2023-06-17 | Stop reason: HOSPADM

## 2023-06-15 RX ORDER — DOCUSATE SODIUM 100 MG/1
100 CAPSULE, LIQUID FILLED ORAL DAILY
Status: DISCONTINUED | OUTPATIENT
Start: 2023-06-15 | End: 2023-06-17 | Stop reason: HOSPADM

## 2023-06-15 RX ORDER — SODIUM CHLORIDE 9 MG/ML
INJECTION, SOLUTION INTRAVENOUS CONTINUOUS
Status: ACTIVE | OUTPATIENT
Start: 2023-06-15 | End: 2023-06-16

## 2023-06-15 RX ORDER — HYDROMORPHONE HYDROCHLORIDE 1 MG/ML
0.5 INJECTION, SOLUTION INTRAMUSCULAR; INTRAVENOUS; SUBCUTANEOUS EVERY 4 HOURS PRN
Status: DISCONTINUED | OUTPATIENT
Start: 2023-06-15 | End: 2023-06-17 | Stop reason: HOSPADM

## 2023-06-15 RX ORDER — ACETAMINOPHEN 325 MG/1
650 TABLET ORAL
Status: DISCONTINUED | OUTPATIENT
Start: 2023-06-15 | End: 2023-06-17 | Stop reason: HOSPADM

## 2023-06-15 RX ADMIN — POLYETHYLENE GLYCOL 3350 17 G: 17 POWDER, FOR SOLUTION ORAL at 14:57

## 2023-06-15 RX ADMIN — OXYCODONE HYDROCHLORIDE 5 MG: 5 TABLET ORAL at 19:55

## 2023-06-15 RX ADMIN — DOCUSATE SODIUM 100 MG: 100 CAPSULE, LIQUID FILLED ORAL at 14:57

## 2023-06-15 RX ADMIN — SODIUM CHLORIDE 1000 ML: 9 INJECTION, SOLUTION INTRAVENOUS at 10:44

## 2023-06-15 RX ADMIN — TAMSULOSIN HYDROCHLORIDE 0.4 MG: 0.4 CAPSULE ORAL at 15:03

## 2023-06-15 RX ADMIN — SODIUM CHLORIDE 1000 ML: 9 INJECTION, SOLUTION INTRAVENOUS at 12:36

## 2023-06-15 RX ADMIN — SODIUM CHLORIDE: 9 INJECTION, SOLUTION INTRAVENOUS at 21:27

## 2023-06-15 RX ADMIN — ACETAMINOPHEN 650 MG: 325 TABLET ORAL at 18:25

## 2023-06-15 RX ADMIN — ACETAMINOPHEN 650 MG: 325 TABLET ORAL at 14:57

## 2023-06-15 RX ADMIN — SODIUM CHLORIDE: 9 INJECTION, SOLUTION INTRAVENOUS at 14:57

## 2023-06-15 RX ADMIN — ACETAMINOPHEN 650 MG: 325 TABLET ORAL at 23:22

## 2023-06-15 RX ADMIN — KETOROLAC TROMETHAMINE 15 MG: 15 INJECTION, SOLUTION INTRAMUSCULAR; INTRAVENOUS at 10:45

## 2023-06-15 RX ADMIN — HYDROMORPHONE HYDROCHLORIDE 0.5 MG: 1 INJECTION, SOLUTION INTRAMUSCULAR; INTRAVENOUS; SUBCUTANEOUS at 21:23

## 2023-06-15 ASSESSMENT — COGNITIVE AND FUNCTIONAL STATUS - GENERAL
CLIMB 3 TO 5 STEPS WITH RAILING: 4 - NONE
STANDING UP FROM CHAIR USING ARMS: 4 - NONE
MOVING TO AND FROM BED TO CHAIR: 4 - NONE
WALKING IN HOSPITAL ROOM: 4 - NONE

## 2023-06-15 ASSESSMENT — ENCOUNTER SYMPTOMS
CARDIOVASCULAR NEGATIVE: 1
SHORTNESS OF BREATH: 0
NEUROLOGICAL NEGATIVE: 1
RESPIRATORY NEGATIVE: 1
ABDOMINAL PAIN: 1
NAUSEA: 0
FLANK PAIN: 1
CONSTITUTIONAL NEGATIVE: 1
DIARRHEA: 0
VOMITING: 0
PSYCHIATRIC NEGATIVE: 1
FREQUENCY: 1

## 2023-06-15 NOTE — H&P
Urology History and Physical    Subjective     Tian Alas is a 52 y.o. male with HLD who presents to the ED for a second time since 3 days prior with left renal colic from an obstructing L UVJ calculus. He initially presented to the ED 6/12 with renal colic starting 6/10. He denies fevers, chills, nausea or vomiting.     He admits to abdominal bloating and distention. He had a bowel movement yesterday and continues to pass gas.    CTAP 6/12 showed delayed left nephrogram and left hydronephrosis from obstructing L 4mm UVJ stone.  Creatinine today is elevated at 1.7 from 1.1 at baseline. UA shows neg N, neg LE, 0-4 rbc, 0-3 wbc     Medical History:   Past Medical History:   Diagnosis Date   • Lipid disorder        Surgical History:   History reviewed. No pertinent surgical history.    Social History:   Social History     Social History Narrative   • Not on file       Family History:   History reviewed. No pertinent family history.    Allergies:   Patient has no known allergies.    Home Medications:  Not in a hospital admission.    Current Medications:  •  acetaminophen, 650 mg, oral, q4h TREE  •  glucose, 16-32 g of dextrose, oral, PRN **OR** dextrose, 15-30 g of dextrose, oral, PRN **OR** glucagon, 1 mg, intramuscular, PRN **OR** dextrose 50 % in water (D50), 25 mL, intravenous, PRN  •  docusate sodium, 100 mg, oral, Daily  •  HYDROmorphone, 0.5 mg, intravenous, Once  •  oxyCODONE, 5 mg, oral, q4h PRN **AND** HYDROmorphone, 0.5 mg, intravenous, q4h PRN  •  ondansetron, 4 mg, intravenous, q8h PRN  •  polyethylene glycol, 17 g, oral, Daily  •  sodium chloride, 1,000 mL, intravenous, Once  •  sodium chloride 0.9 %, , intravenous, Continuous  •  tamsulosin, 0.4 mg, oral, Nightly  •  escitalopram  •  ondansetron  •  oxyCODONE-acetaminophen  •  rosuvastatin    Review of Systems:  Constitutional: negative for fevers, chills, and weight loss  Eyes: negative for visual disturbances  Ears, nose, and throat: negative for  "ear drainage, hearing loss, nasal congestion and sore throat  Respiratory: negative for cough and shortness of breath  Cardiovascular: negative for chest pain, palpitations and syncope  Gastrointestinal: negative for abdominal pain, constipation, nausea and vomiting  Genitourinary: L flank pain, increased frequency   Integument/breast: negative for pruritus and rash  Hematologic/lymphatic: negative for bleeding and easy bruising  Musculoskeletal: negative for arthralgias and myalgias  Neurological: negative for headaches and weakness  Behavioral/Psych: negative for anxiety and fatigue  Allergic/Immunologic: negative for urticaria      Objective     Physicial Exam  Visit Vitals  BP (!) 149/88 (BP Location: Left upper arm, Patient Position: Sitting)   Pulse 72   Temp 36.4 °C (97.6 °F) (Tympanic)   Resp 16   Ht 1.651 m (5' 5\")   Wt 68 kg (150 lb)   SpO2 98%   BMI 24.96 kg/m²     General appearance: alert, cooperative, no acute distress  Head: normocephalic, atraumatic  Eyes: lids and lashes normal, sclerae white  Nose: no discharge  Throat: lips normal without lesions  Neck: supple, symmetrical, trachea midline  Lungs: Unlabored respirations, symmetric chest expansion  Heart: regular rate and rhythm  Abdomen: soft, non-tender to palpation, non-distended  : L cvat   Extremities: extremities normal, warm and well-perfused; no cyanosis, clubbing, or edema and no redness or tenderness in the calves bilaterally  Skin: no rashes or ulcers  Lymph nodes: no inguinal adenopathy  Neurologic: Alert and oriented X 3     Labs  BMP Results       06/15/23 06/12/23     1046 2058     138    K 4.2 4.4    Cl 103 104    CO2 27 26    Glucose 118 149    BUN 23 20    Creatinine 1.7 1.1    Calcium 9.1 9.2    Anion Gap 6 8    EGFR 42.7 >60.0         Comment for K at 1046 on 06/15/23: Results obtained on plasma. Plasma Potassium values may be up to 0.4 mEQ/L less than serum values. The differences may be greater for patients with high " platelet or white cell counts.    Comment for K at 2058 on 06/12/23: Results obtained on plasma. Plasma Potassium values may be up to 0.4 mEQ/L less than serum values. The differences may be greater for patients with high platelet or white cell counts.        CBC Results       06/15/23 06/12/23     1046 2058    WBC 9.88 14.64    RBC 5.14 4.91    HGB 14.5 14.1    HCT 44.5 42.9    MCV 86.6 87.4    MCH 28.2 28.7    MCHC 32.6 32.9     271        UA Results       06/15/23     1149    Color Yellow    Clarity Clear    Glucose Negative    Bilirubin Negative    Ketones Negative    Sp Grav 1.011    Blood +1    Ph 6.0    Protein Negative    Urobilinogen 0.2    Nitrite Negative    Leuk Est Negative    WBC 0 TO 3    RBC 0 TO 4    Bacteria Rare         Comment for Blood at 1149 on 06/15/23: The sensitivity of the occult blood test is equivalent to approximately 4 intact RBC/HPF.    Comment for Leuk Est at 1149 on 06/15/23: Results can be falsely negative due to high specific gravity, some antibiotics, glucose >3 g/dl, or WBC other than neutrophils.        Microbiology Results     ** No results found for the last 720 hours. **            Imaging   As above       Assessment   52 y.o. male with HLD who presents to the ED for a second time since 3 days prior with an obstructing 4mm L UVJ calculus         Plan     - admit to urology  - NPO at midnight. Allow regular diet  - Tylenol, oxycodone, dilaudid as needed for pain   - NS at 125   - bowel regimen   - add on case for L URS/LL 6/16      [facility]  Pager [phone]  Sven Grey DO PGY4

## 2023-06-15 NOTE — PLAN OF CARE
Problem: Adult Inpatient Plan of Care  Goal: Plan of Care Review  Outcome: Progressing  Flowsheets (Taken 6/15/2023 8323)  Progress: improving  Outcome Evaluation: ER admit, OOB self, straining urine, no stone, poss OR tomm, IVF running, A&Ox4, walked in room, c/o slight flank pain, tylenol for pain  Plan of Care Reviewed With: patient   Plan of Care Review  Plan of Care Reviewed With: patient  Progress: improving  Outcome Evaluation: ER admit, OOB self, straining urine, no stone, poss OR tomm, IVF running, A&Ox4, walked in room, c/o slight flank pain, tylenol for pain

## 2023-06-15 NOTE — ED PROVIDER NOTES
Emergency Medicine Note  HPI   HISTORY OF PRESENT ILLNESS     Patient returns to the ER with persistent pain status post recent diagnosis of 4 mm left UVJ kidney stone.  Patient had intractable pain last night and is here now for pain management.  Patient denied nausea or vomiting or fever.  Patient is due to see Dr. Mercer next week            Patient History   PAST HISTORY     Reviewed from Nursing Triage:       Past Medical History:   Diagnosis Date   • Lipid disorder        History reviewed. No pertinent surgical history.    History reviewed. No pertinent family history.    Social History     Tobacco Use   • Smoking status: Never   • Smokeless tobacco: Never   Substance Use Topics   • Alcohol use: Yes   • Drug use: Never         Review of Systems   REVIEW OF SYSTEMS     Review of Systems   Constitutional: Negative.    HENT: Negative.    Respiratory: Negative.  Negative for shortness of breath.    Cardiovascular: Negative.  Negative for chest pain.   Gastrointestinal: Positive for abdominal pain. Negative for diarrhea, nausea and vomiting.   Genitourinary: Positive for flank pain and frequency.   Skin: Negative for rash.   Neurological: Negative.    Psychiatric/Behavioral: Negative.          VITALS     ED Vitals    Date/Time Temp Pulse Resp BP SpO2 [facility]   06/15/23 1325 -- 65 19 144/83 98 % AGO   06/15/23 1030 36.4 °C (97.6 °F) 72 16 149/88 98 % [facility]        Pulse Ox %: 98 % (06/15/23 1346)  Pulse Ox Interpretation: Normal (06/15/23 1346)           Physical Exam   PHYSICAL EXAM     Physical Exam  Constitutional:       Appearance: Normal appearance.   Cardiovascular:      Rate and Rhythm: Normal rate and regular rhythm.      Heart sounds: Normal heart sounds.   Pulmonary:      Effort: Pulmonary effort is normal.      Breath sounds: Normal breath sounds.   Abdominal:      General: Abdomen is flat.      Palpations: Abdomen is soft.   Musculoskeletal:         General: Normal range of motion.   Skin:     General: Skin is  warm and dry.   Neurological:      General: No focal deficit present.      Mental Status: He is alert and oriented to person, place, and time.   Psychiatric:         Mood and Affect: Mood normal.           PROCEDURES     Procedures     DATA     Results     Procedure Component Value Units Date/Time    UA with reflex culture [[id number]]  (Abnormal) Collected: 06/15/23 1149    Specimen: Urine, Clean Catch Updated: 06/15/23 1220    Narrative:      The following orders were created for panel order UA with reflex culture.  Procedure                               Abnormality         Status                     ---------                               -----------         ------                     UA Reflex to Culture (Ma...[[id number]]  Abnormal            Final result               UA Microscopic[[id number]]               Abnormal            Final result                 Please view results for these tests on the individual orders.    UA Microscopic [[id number]]  (Abnormal) Collected: 06/15/23 1149    Specimen: Urine, Clean Catch Updated: 06/15/23 1220     RBC, Urine 0 TO 4 /HPF      WBC, Urine 0 TO 3 /HPF      Squamous Epithelial None Seen /hpf      Hyaline Cast None Seen /lpf      Bacteria, Urine Rare /HPF      Mucus Rare /LPF     UA Reflex to Culture (Macroscopic) [[id number]]  (Abnormal) Collected: 06/15/23 1149    Specimen: Urine, Clean Catch Updated: 06/15/23 1213     Color, Urine Yellow     Clarity, Urine Clear     Specific Gravity, Urine 1.011     pH, Urine 6.0     Leukocyte Esterase Negative     Comment: Results can be falsely negative due to high specific gravity, some antibiotics, glucose >3 g/dl, or WBC other than neutrophils.        Nitrite, Urine Negative     Protein, Urine Negative     Glucose, Urine Negative mg/dL      Ketones, Urine Negative mg/dL      Urobilinogen, Urine 0.2 EU/dL      Bilirubin, Urine Negative mg/dL      Blood, Urine +1     Comment: The sensitivity of the occult blood test is equivalent to  approximately 4 intact RBC/HPF.       Comprehensive metabolic panel [[id number]]  (Abnormal) Collected: 06/15/23 1046    Specimen: Blood, Venous Updated: 06/15/23 1119     Sodium 136 mEQ/L      Potassium 4.2 mEQ/L      Comment: Results obtained on plasma. Plasma Potassium values may be up to 0.4 mEQ/L less than serum values. The differences may be greater for patients with high platelet or white cell counts.        Chloride 103 mEQ/L      CO2 27 mEQ/L      BUN 23 mg/dL      Creatinine 1.7 mg/dL      Glucose 118 mg/dL      Calcium 9.1 mg/dL      AST (SGOT) 23 IU/L      ALT (SGPT) 21 IU/L      Alkaline Phosphatase 86 IU/L      Total Protein 7.8 g/dL      Comment: Test performed on plasma which typically contains approximately 0.4 g/dL more protein than serum.        Albumin 4.0 g/dL      Bilirubin, Total 0.8 mg/dL      eGFR 42.7 mL/min/1.73m*2      Anion Gap 6 mEQ/L     Lipase [[id number]]  (Normal) Collected: 06/15/23 1046    Specimen: Blood, Venous Updated: 06/15/23 1114     Lipase 29 U/L     CBC and differential [[id number]] Collected: 06/15/23 1046    Specimen: Blood, Venous Updated: 06/15/23 1058     WBC 9.88 K/uL      RBC 5.14 M/uL      Hemoglobin 14.5 g/dL      Hematocrit 44.5 %      MCV 86.6 fL      MCH 28.2 pg      MCHC 32.6 g/dL      RDW 12.6 %      Platelets 266 K/uL      MPV 10.4 fL      Differential Type Auto     nRBC 0.0 %      Immature Granulocytes 0.3 %      Neutrophils 70.9 %      Lymphocytes 17.5 %      Monocytes 8.8 %      Eosinophils 2.2 %      Basophils 0.3 %      Immature Granulocytes, Absolute 0.03 K/uL      Neutrophils, Absolute 7.00 K/uL      Lymphocytes, Absolute 1.73 K/uL      Monocytes, Absolute 0.87 K/uL      Eosinophils, Absolute 0.22 K/uL      Basophils, Absolute 0.03 K/uL           Imaging Results    None         No orders to display       Scoring tools                                  ED Course & MDM   MDM / ED COURSE / CLINICAL IMPRESSION / DISPO     MDM    ED Course as of 06/15/23 2801    Thu Salvatore 15, 2023   1225 Creatinine(!): 1.7  Slight bump in his creatinine from the other day will order second liter of fluid [ANA LUISA]   1346 Patient was admitted by urology for cystoscopy and laser lithotripsy for tomorrow [ANA LUISA]      ED Course User Index  [ANA LUISA] Salvador Oropeza DO     Clinical Impression      Left ureteral calculus     _________________     ED Disposition   Admit / Observation                   Salvador Oropeza DO  06/15/23 1347       Salvador Oropeza DO  06/15/23 1602

## 2023-06-16 ENCOUNTER — ANESTHESIA (OUTPATIENT)
Dept: OPERATING ROOM | Facility: HOSPITAL | Age: 53
End: 2023-06-16
Payer: COMMERCIAL

## 2023-06-16 ENCOUNTER — ANESTHESIA EVENT (OUTPATIENT)
Dept: OPERATING ROOM | Facility: HOSPITAL | Age: 53
End: 2023-06-16
Payer: COMMERCIAL

## 2023-06-16 ENCOUNTER — APPOINTMENT (OUTPATIENT)
Dept: RADIOLOGY | Facility: HOSPITAL | Age: 53
End: 2023-06-16
Attending: UROLOGY
Payer: COMMERCIAL

## 2023-06-16 PROBLEM — N20.1 LEFT URETERAL CALCULUS: Status: ACTIVE | Noted: 2023-06-15

## 2023-06-16 LAB
ANION GAP SERPL CALC-SCNC: 5 MEQ/L (ref 3–15)
APTT PPP: 38 SEC (ref 23–35)
BUN SERPL-MCNC: 19 MG/DL (ref 8–20)
CALCIUM SERPL-MCNC: 8.4 MG/DL (ref 8.9–10.3)
CHLORIDE SERPL-SCNC: 107 MEQ/L (ref 98–109)
CO2 SERPL-SCNC: 23 MEQ/L (ref 22–32)
CREAT SERPL-MCNC: 1.3 MG/DL (ref 0.8–1.3)
ERYTHROCYTE [DISTWIDTH] IN BLOOD BY AUTOMATED COUNT: 12.6 % (ref 11.6–14.4)
GFR SERPL CREATININE-BSD FRML MDRD: 58.2 ML/MIN/1.73M*2
GLUCOSE SERPL-MCNC: 93 MG/DL (ref 70–99)
HCT VFR BLDCO AUTO: 37.8 % (ref 40.1–51)
HGB BLD-MCNC: 12.5 G/DL (ref 13.7–17.5)
INR PPP: 1.1
MCH RBC QN AUTO: 28.6 PG (ref 28–33.2)
MCHC RBC AUTO-ENTMCNC: 33.1 G/DL (ref 32.2–36.5)
MCV RBC AUTO: 86.5 FL (ref 83–98)
PDW BLD AUTO: 11 FL (ref 9.4–12.4)
PLATELET # BLD AUTO: 223 K/UL (ref 150–350)
POTASSIUM SERPL-SCNC: 4.6 MEQ/L (ref 3.6–5.1)
PROTHROMBIN TIME: 13.8 SEC (ref 12.2–14.5)
RBC # BLD AUTO: 4.37 M/UL (ref 4.5–5.8)
SODIUM SERPL-SCNC: 135 MEQ/L (ref 136–144)
WBC # BLD AUTO: 9.59 K/UL (ref 3.8–10.5)

## 2023-06-16 PROCEDURE — C1769 GUIDE WIRE: HCPCS

## 2023-06-16 PROCEDURE — 85730 THROMBOPLASTIN TIME PARTIAL: CPT | Performed by: UROLOGY

## 2023-06-16 PROCEDURE — 74176 CT ABD & PELVIS W/O CONTRAST: CPT | Mod: MG

## 2023-06-16 PROCEDURE — C1894 INTRO/SHEATH, NON-LASER: HCPCS

## 2023-06-16 PROCEDURE — C1769 GUIDE WIRE: HCPCS | Performed by: UROLOGY

## 2023-06-16 PROCEDURE — 63600000 HC DRUGS/DETAIL CODE: Mod: JZ | Performed by: UROLOGY

## 2023-06-16 PROCEDURE — 36100360 IR URETERAL (INTERNAL JJ") OR PCNU PLACEMENT"

## 2023-06-16 PROCEDURE — 71000011 HC PACU PHASE 1 EA ADDL MIN: Performed by: UROLOGY

## 2023-06-16 PROCEDURE — C1758 CATHETER, URETERAL: HCPCS | Performed by: UROLOGY

## 2023-06-16 PROCEDURE — 25800000 HC PHARMACY IV SOLUTIONS: Performed by: UROLOGY

## 2023-06-16 PROCEDURE — 27200000 HC STERILE SUPPLY: Performed by: UROLOGY

## 2023-06-16 PROCEDURE — C1887 CATHETER, GUIDING: HCPCS

## 2023-06-16 PROCEDURE — C2617 STENT, NON-COR, TEM W/O DEL: HCPCS

## 2023-06-16 PROCEDURE — 25000000 HC PHARMACY GENERAL: Performed by: RADIOLOGY

## 2023-06-16 PROCEDURE — 76942 ECHO GUIDE FOR BIOPSY: CPT

## 2023-06-16 PROCEDURE — 63700000 HC SELF-ADMINISTRABLE DRUG: Performed by: UROLOGY

## 2023-06-16 PROCEDURE — 63600000 HC DRUGS/DETAIL CODE: Mod: JZ | Performed by: INTERNAL MEDICINE

## 2023-06-16 PROCEDURE — 27200000 HC STERILE SUPPLY

## 2023-06-16 PROCEDURE — 36000013 HC OR LEVEL 3 EA ADDL MIN: Performed by: UROLOGY

## 2023-06-16 PROCEDURE — 71000001 HC PACU PHASE 1 INITIAL 30MIN: Performed by: UROLOGY

## 2023-06-16 PROCEDURE — 25000000 HC PHARMACY GENERAL: Performed by: INTERNAL MEDICINE

## 2023-06-16 PROCEDURE — C1725 CATH, TRANSLUMIN NON-LASER: HCPCS

## 2023-06-16 PROCEDURE — 63600105 HC IODINE BASED CONTRAST: Performed by: UROLOGY

## 2023-06-16 PROCEDURE — 37000001 HC ANESTHESIA GENERAL: Performed by: UROLOGY

## 2023-06-16 PROCEDURE — 36000003 HC OR LEVEL 3 INITIAL 30MIN: Performed by: UROLOGY

## 2023-06-16 PROCEDURE — 63600000 HC DRUGS/DETAIL CODE: Mod: JZ | Performed by: RADIOLOGY

## 2023-06-16 PROCEDURE — 80048 BASIC METABOLIC PNL TOTAL CA: CPT | Performed by: UROLOGY

## 2023-06-16 PROCEDURE — 0TJB8ZZ INSPECTION OF BLADDER, VIA NATURAL OR ARTIFICIAL OPENING ENDOSCOPIC: ICD-10-PCS | Performed by: UROLOGY

## 2023-06-16 PROCEDURE — 85610 PROTHROMBIN TIME: CPT | Performed by: UROLOGY

## 2023-06-16 PROCEDURE — 25800000 HC PHARMACY IV SOLUTIONS: Performed by: INTERNAL MEDICINE

## 2023-06-16 PROCEDURE — 36415 COLL VENOUS BLD VENIPUNCTURE: CPT | Performed by: UROLOGY

## 2023-06-16 PROCEDURE — 85027 COMPLETE CBC AUTOMATED: CPT | Performed by: UROLOGY

## 2023-06-16 PROCEDURE — 71000001 HC PACU PHASE 1 INITIAL 30MIN

## 2023-06-16 RX ORDER — EPHEDRINE SULFATE/0.9% NACL/PF 50 MG/5 ML
SYRINGE (ML) INTRAVENOUS AS NEEDED
Status: DISCONTINUED | OUTPATIENT
Start: 2023-06-16 | End: 2023-06-16 | Stop reason: SURG

## 2023-06-16 RX ORDER — FUROSEMIDE 10 MG/ML
INJECTION INTRAMUSCULAR; INTRAVENOUS AS NEEDED
Status: DISCONTINUED | OUTPATIENT
Start: 2023-06-16 | End: 2023-06-16 | Stop reason: SURG

## 2023-06-16 RX ORDER — ROSUVASTATIN CALCIUM 20 MG/1
20 TABLET, COATED ORAL
Status: DISCONTINUED | OUTPATIENT
Start: 2023-06-16 | End: 2023-06-17 | Stop reason: HOSPADM

## 2023-06-16 RX ORDER — LIDOCAINE HYDROCHLORIDE 10 MG/ML
INJECTION, SOLUTION INFILTRATION; PERINEURAL
Status: COMPLETED | OUTPATIENT
Start: 2023-06-16 | End: 2023-06-16

## 2023-06-16 RX ORDER — DEXAMETHASONE SODIUM PHOSPHATE 4 MG/ML
INJECTION, SOLUTION INTRA-ARTICULAR; INTRALESIONAL; INTRAMUSCULAR; INTRAVENOUS; SOFT TISSUE AS NEEDED
Status: DISCONTINUED | OUTPATIENT
Start: 2023-06-16 | End: 2023-06-16 | Stop reason: SURG

## 2023-06-16 RX ORDER — ONDANSETRON HYDROCHLORIDE 2 MG/ML
INJECTION, SOLUTION INTRAVENOUS AS NEEDED
Status: DISCONTINUED | OUTPATIENT
Start: 2023-06-16 | End: 2023-06-16 | Stop reason: SURG

## 2023-06-16 RX ORDER — FENTANYL CITRATE 50 UG/ML
INJECTION, SOLUTION INTRAMUSCULAR; INTRAVENOUS AS NEEDED
Status: DISCONTINUED | OUTPATIENT
Start: 2023-06-16 | End: 2023-06-16 | Stop reason: SURG

## 2023-06-16 RX ORDER — PROPOFOL 10 MG/ML
INJECTION, EMULSION INTRAVENOUS AS NEEDED
Status: DISCONTINUED | OUTPATIENT
Start: 2023-06-16 | End: 2023-06-16 | Stop reason: SURG

## 2023-06-16 RX ORDER — ONDANSETRON HYDROCHLORIDE 2 MG/ML
4 INJECTION, SOLUTION INTRAVENOUS
Status: DISCONTINUED | OUTPATIENT
Start: 2023-06-16 | End: 2023-06-17 | Stop reason: HOSPADM

## 2023-06-16 RX ORDER — DEXTROSE 50 % IN WATER (D50W) INTRAVENOUS SYRINGE
25 AS NEEDED
Status: DISCONTINUED | OUTPATIENT
Start: 2023-06-16 | End: 2023-06-17 | Stop reason: HOSPADM

## 2023-06-16 RX ORDER — HYDROMORPHONE HYDROCHLORIDE 1 MG/ML
0.5 INJECTION, SOLUTION INTRAMUSCULAR; INTRAVENOUS; SUBCUTANEOUS
Status: DISCONTINUED | OUTPATIENT
Start: 2023-06-16 | End: 2023-06-17 | Stop reason: HOSPADM

## 2023-06-16 RX ORDER — IBUPROFEN 200 MG
16-32 TABLET ORAL AS NEEDED
Status: DISCONTINUED | OUTPATIENT
Start: 2023-06-16 | End: 2023-06-17 | Stop reason: HOSPADM

## 2023-06-16 RX ORDER — ESCITALOPRAM OXALATE 10 MG/1
10 TABLET ORAL DAILY
Status: DISCONTINUED | OUTPATIENT
Start: 2023-06-16 | End: 2023-06-17 | Stop reason: HOSPADM

## 2023-06-16 RX ORDER — LIDOCAINE HYDROCHLORIDE 10 MG/ML
INJECTION, SOLUTION EPIDURAL; INFILTRATION; INTRACAUDAL; PERINEURAL AS NEEDED
Status: DISCONTINUED | OUTPATIENT
Start: 2023-06-16 | End: 2023-06-16 | Stop reason: SURG

## 2023-06-16 RX ORDER — FENTANYL CITRATE 50 UG/ML
INJECTION, SOLUTION INTRAMUSCULAR; INTRAVENOUS
Status: COMPLETED | OUTPATIENT
Start: 2023-06-16 | End: 2023-06-16

## 2023-06-16 RX ORDER — METHYLENE BLUE 5 MG/ML
INJECTION INTRAVENOUS AS NEEDED
Status: DISCONTINUED | OUTPATIENT
Start: 2023-06-16 | End: 2023-06-16 | Stop reason: SURG

## 2023-06-16 RX ORDER — MIDAZOLAM HYDROCHLORIDE 2 MG/2ML
INJECTION, SOLUTION INTRAMUSCULAR; INTRAVENOUS
Status: COMPLETED | OUTPATIENT
Start: 2023-06-16 | End: 2023-06-16

## 2023-06-16 RX ORDER — DEXTROSE 40 %
15-30 GEL (GRAM) ORAL AS NEEDED
Status: DISCONTINUED | OUTPATIENT
Start: 2023-06-16 | End: 2023-06-17 | Stop reason: HOSPADM

## 2023-06-16 RX ORDER — FENTANYL CITRATE 50 UG/ML
50 INJECTION, SOLUTION INTRAMUSCULAR; INTRAVENOUS EVERY 5 MIN PRN
Status: DISCONTINUED | OUTPATIENT
Start: 2023-06-16 | End: 2023-06-17 | Stop reason: HOSPADM

## 2023-06-16 RX ORDER — SODIUM CHLORIDE, SODIUM GLUCONATE, SODIUM ACETATE, POTASSIUM CHLORIDE AND MAGNESIUM CHLORIDE 30; 37; 368; 526; 502 MG/100ML; MG/100ML; MG/100ML; MG/100ML; MG/100ML
INJECTION, SOLUTION INTRAVENOUS CONTINUOUS PRN
Status: DISCONTINUED | OUTPATIENT
Start: 2023-06-16 | End: 2023-06-16 | Stop reason: SURG

## 2023-06-16 RX ADMIN — Medication 10 MG: at 12:06

## 2023-06-16 RX ADMIN — CEFAZOLIN 2 G: 2 INJECTION, POWDER, FOR SOLUTION INTRAMUSCULAR; INTRAVENOUS at 09:21

## 2023-06-16 RX ADMIN — METHYLENE BLUE 50 MG: 5 INJECTION INTRAVENOUS at 12:34

## 2023-06-16 RX ADMIN — Medication 10 MG: at 12:17

## 2023-06-16 RX ADMIN — METHYLENE BLUE 50 MG: 5 INJECTION INTRAVENOUS at 11:59

## 2023-06-16 RX ADMIN — LIDOCAINE HYDROCHLORIDE 10 ML: 10 INJECTION, SOLUTION INFILTRATION; PERINEURAL at 17:53

## 2023-06-16 RX ADMIN — SODIUM CHLORIDE: 9 INJECTION, SOLUTION INTRAVENOUS at 03:36

## 2023-06-16 RX ADMIN — ACETAMINOPHEN 650 MG: 325 TABLET ORAL at 03:36

## 2023-06-16 RX ADMIN — DEXAMETHASONE SODIUM PHOSPHATE 4 MG: 4 INJECTION, SOLUTION INTRA-ARTICULAR; INTRALESIONAL; INTRAMUSCULAR; INTRAVENOUS; SOFT TISSUE at 11:48

## 2023-06-16 RX ADMIN — ROSUVASTATIN CALCIUM 20 MG: 20 TABLET, FILM COATED ORAL at 21:58

## 2023-06-16 RX ADMIN — ACETAMINOPHEN 650 MG: 325 TABLET ORAL at 07:14

## 2023-06-16 RX ADMIN — FUROSEMIDE 10 MG: 10 INJECTION, SOLUTION INTRAMUSCULAR; INTRAVENOUS at 12:12

## 2023-06-16 RX ADMIN — FENTANYL CITRATE 50 MCG: 50 INJECTION INTRAMUSCULAR; INTRAVENOUS at 18:03

## 2023-06-16 RX ADMIN — MIDAZOLAM HYDROCHLORIDE 1 MG: 1 INJECTION, SOLUTION INTRAMUSCULAR; INTRAVENOUS at 18:03

## 2023-06-16 RX ADMIN — ACETAMINOPHEN 650 MG: 325 TABLET ORAL at 23:58

## 2023-06-16 RX ADMIN — MIDAZOLAM HYDROCHLORIDE 1 MG: 1 INJECTION, SOLUTION INTRAMUSCULAR; INTRAVENOUS at 17:53

## 2023-06-16 RX ADMIN — FENTANYL CITRATE 50 MCG: 50 INJECTION INTRAMUSCULAR; INTRAVENOUS at 11:33

## 2023-06-16 RX ADMIN — ACETAMINOPHEN 650 MG: 325 TABLET ORAL at 18:48

## 2023-06-16 RX ADMIN — ONDANSETRON 4 MG: 2 INJECTION INTRAMUSCULAR; INTRAVENOUS at 11:48

## 2023-06-16 RX ADMIN — FUROSEMIDE 10 MG: 10 INJECTION, SOLUTION INTRAMUSCULAR; INTRAVENOUS at 12:24

## 2023-06-16 RX ADMIN — ACETAMINOPHEN 650 MG: 325 TABLET ORAL at 15:29

## 2023-06-16 RX ADMIN — Medication 10 MG: at 11:53

## 2023-06-16 RX ADMIN — SODIUM CHLORIDE, SODIUM GLUCONATE, SODIUM ACETATE, POTASSIUM CHLORIDE AND MAGNESIUM CHLORIDE: 526; 502; 368; 37; 30 INJECTION, SOLUTION INTRAVENOUS at 11:27

## 2023-06-16 RX ADMIN — IOHEXOL 15 ML: 300 INJECTION, SOLUTION INTRAVENOUS at 18:25

## 2023-06-16 RX ADMIN — LIDOCAINE HYDROCHLORIDE 10 ML: 10 INJECTION, SOLUTION EPIDURAL; INFILTRATION; INTRACAUDAL; PERINEURAL at 11:33

## 2023-06-16 RX ADMIN — FUROSEMIDE 10 MG: 10 INJECTION, SOLUTION INTRAMUSCULAR; INTRAVENOUS at 12:40

## 2023-06-16 RX ADMIN — Medication 10 MG: at 12:16

## 2023-06-16 RX ADMIN — PROPOFOL 200 MG: 10 INJECTION, EMULSION INTRAVENOUS at 11:33

## 2023-06-16 RX ADMIN — FENTANYL CITRATE 50 MCG: 50 INJECTION INTRAMUSCULAR; INTRAVENOUS at 17:53

## 2023-06-16 RX ADMIN — FUROSEMIDE 10 MG: 10 INJECTION, SOLUTION INTRAMUSCULAR; INTRAVENOUS at 12:04

## 2023-06-16 RX ADMIN — HYDROMORPHONE HYDROCHLORIDE 0.5 MG: 1 INJECTION, SOLUTION INTRAMUSCULAR; INTRAVENOUS; SUBCUTANEOUS at 01:53

## 2023-06-16 RX ADMIN — FENTANYL CITRATE 50 MCG: 50 INJECTION INTRAMUSCULAR; INTRAVENOUS at 12:44

## 2023-06-16 ASSESSMENT — ENCOUNTER SYMPTOMS: DEPRESSION: 1

## 2023-06-16 ASSESSMENT — COGNITIVE AND FUNCTIONAL STATUS - GENERAL
WALKING IN HOSPITAL ROOM: 4 - NONE
CLIMB 3 TO 5 STEPS WITH RAILING: 4 - NONE
MOVING TO AND FROM BED TO CHAIR: 4 - NONE
STANDING UP FROM CHAIR USING ARMS: 4 - NONE

## 2023-06-16 NOTE — OP NOTE
CYSTO, attempted left retrograde pyelogram (#[id number]) (L) Procedure Note     Procedure:    CYSTO, attempted left retrograde pyelogram (#[id number])  CPT(R) Code:  14063 - VA CYSTO/URETERO W/LITHOTRIPSY &INDWELL STENT INSRT    Indications: 52 y.o. male with HLD who presents to the ED for a second time since 3 days prior with an obstructing 4mm L UVJ calculus        Pre-op Diagnosis     * Left ureteral calculus [N20.1]    Post-op Diagnosis     * Left ureteral calculus [N20.1]    Surgeon: Evangelina Mercer MD     Assistants: Sven Grey DO PGY 4    Anesthesia: General LMA anesthesia    ASA Class: see anesthesia      Procedure Details   Patient was met in the preoperative holding area identified by name and MRN.  Consent was reviewed and he agreed to proceed.  He was taken to the operating room and placed on the operating table.  Preoperative antibiotics Ancef.  He was induced under general LMA anesthesia.  He was placed in dorsal lithotomy position.  He was prepped and draped in sterile fashion.  Timeout was taken and all parties were in agreement.    22 Maltese cystoscope with 30 degrees lens was inserted into the patient's bladder.  Pan cystoscopy was performed which was notable for focal erythema and edema in the vicinity of his left ureteral orifice.  His left ureteral orifice was not able to be identified due to significant edema.  His right ureteral orifice was located in orthotopic position.  He had no stones or masses within his bladder.  Despite administering methylene blue x2 and Lasix 20 mg x 2, his left ureteral orifice could not be identified.  Multiple attempts to cannulate his left ureteral orifice were made with both a sensor wire and angled Glidewire without success.  After over an hour of attempting to locate and cannulated the left ureteral orifice, decision was made to abort the procedure and obtain a  noncontrast CT to have follow-up studies of his left ureteral stone.  Depending on the patient's comfort,  decision will be made to continue medical expulsion therapy versus consulting interventional radiology for a left percutaneous for ureteral tube placement.    Findings:  1. Bullous edema/erythema in the vicinity of his left ureteral orifice   2. Unable to locate left ureteral orifice, right ureteral orifice located in orthotopic position       Estimated Blood Loss:  No blood loss documented.           Drains: none           Total IV Fluids: see anesthesia            Specimens:   ID Type Source Tests Collected by Time Destination   A : Left Renal Stone Calculus Kidney, Left STONE ANALYSIS Evangelina Mercer MD 6/16/2023 1200               Implants: * No implants in log *           Complications:  Aborted procedure due to inability to locate ureteral orifice            Disposition: PACU - hemodynamically stable.           Condition: stable    Evangelina Mercer MD  Phone Number: [phone]

## 2023-06-16 NOTE — POST-PROCEDURE NOTE
Interventional Radiology Brief Postprocedure Note    Tian Alas     Attending: Harvey Weber MD     Assistant: none    Diagnosis: Left UV junction calculus    Description of procedure: Left PCNU placement with dilatation of left UV junction    Contrast: omnipaque 300     Anesthesia:  Local (Lidocaine), Conscious Sedation    Volume of Lidocaine Utilized (ml): 10     Medications: IV Fentanyl, IV Versed     Complications: None      Estimated Blood Loss: Estimated Blood Loss: None    Anticoagulation: n/a    Specimens: none    Findings: Left hydronephrosis, successful dilatation of left UV junction with probable passage of obstructing calculus into the bladder. 8.5 fr. 22 cm left PCNU placed. Injected contrast passes into the bladder. PCNU capped.    6/16/2023 6:22 PM

## 2023-06-16 NOTE — OR SURGEON
Pre-Procedure patient identification:  I am the primary operating surgeon/proceduralist and I have reviewed the applicable pathology reports and radiology studies for this procedure. I have identified the patient and confirmed laterality is left on 06/16/23 at 10:47 AM Evangelina Mercer MD  Phone Number: [phone]

## 2023-06-16 NOTE — ANESTHESIA POSTPROCEDURE EVALUATION
Patient: Tian Alas    Procedure Summary     Date: 06/16/23 Room / Location: [facility] PAV OR 09 / [facility] OR [facility]    Anesthesia Start: 1127 Anesthesia Stop: 1300    Procedure: CYSTO, attempted left retrograde pyelogram (#[id number]) (Left: Ureter) Diagnosis:       Left ureteral calculus      (Left ureteral calculus [N20.1])    Surgeons: Evangelina Mercer MD Responsible Provider: Teetee Hand MD    Anesthesia Type: general ASA Status: 3          Anesthesia Type: general  PACU Vitals  6/16/2023 1252 - 6/16/2023 1323      6/16/2023  1300 6/16/2023  1315 6/16/2023  1316       BP: 106/57 139/74 139/74     Pulse: 81 73 79     Resp: 12 16 18     SpO2: 98 % 96 % 95 %             Anesthesia Post Evaluation    Pain management: adequate  Patient location during evaluation: PACU  Patient participation: complete - patient participated  Level of consciousness: awake and alert  Cardiovascular status: acceptable  Airway Patency: adequate  Respiratory status: acceptable  Hydration status: acceptable  Anesthetic complications: no

## 2023-06-16 NOTE — PLAN OF CARE
Care Coordination Admission Assessment Note    General Information:  Readmission Within the last 30 days: no previous admission in last 30 days  Does patient have a :    Patient-Specific Goals (include timeframe):      Living Arrangements:  Arrived From: home  Current Living Arrangements: home  People in Home: spouse, child(hiral), adult  Home Accessibility: stairs to enter home (Group), stairs within home (Group)  Living Arrangement Comments: Pt lives in a house powder room on 1st floor and full bathroom and bedroom on 2nd floor. CC confirmed address and pharmacy    Housing Stability and Financial Resources (SDOH):  In the last 12 months, was there a time when you were not able to pay the mortgage or rent on time?:    In the last 12 months, how many places have you lived?:    In the last 12 months, was there a time when you did not have a steady place to sleep or slept in a shelter (including now)?:    How hard is it for you to pay for the very basics like food, housing, medical care, and heating?:      Functional Status Prior to Admission:   Assistive Device/Animal Currently Used at Home: none  Functional Status Comments: independent  IADL Comments: independent     Supports and Services:  Current Outpatient/Agency/Support Group: none  Type of Current Home Care Services:    History of home care episode or rehab stay:      Discharge Needs Assessment:   Concerns to be Addressed: no discharge needs identified  Current Discharge Risk:    Anticipated Changes Related to Illness: none    Patient/Family Anticipated Discharge Plan:  Patient/Family Anticipates Transition To: home with family  Patient/Family Anticipated Services at Transition: none    Patient Choice:   Offered/Gave Vendor List:    Patient's Choice of Community Agency(s):         Anticipated Discharge Plan:  Met with patient. Provided education and contact information for Care Coordination services.: yes  Anticipated Discharge Disposition: home  without assistance or services     Transportation Needs (SDOH):  Transportation Concerns: none  Transportation Anticipated: family or friend will provide  Is Out of Hospital DNR needed at discharge?: no    In the past 12 months, has lack of transportation kept you from medical appointments or from getting medications?: No  In the past 12 months, has lack of transportation kept you from meetings, work, or from getting things needed for daily living?: No    Concerns - comments:  Pt placed on CC w/e handoff for CC team to follow pt for needs.

## 2023-06-16 NOTE — ANESTHESIA PREPROCEDURE EVALUATION
Relevant Problems   No relevant active problems       Anesthesia ROS/MED HX    Anesthesia History    Previous anesthetics  No history of anesthetic complications  Pulmonary - neg  Neuro/Psych    Depression   Anxiety  Cardiovascular   dyslipidemia   ECG reviewed  Endo/Other  Body Habitus: Normal       History reviewed. No pertinent surgical history.    Physical Exam    Airway   Mallampati: II   TM distance: >3 FB   Neck ROM: full    Anesthesia  Exam Comments:   Dental: Denies loose        Anesthesia Plan    Plan: general    Technique: general LMA     Lines and Monitors: PIV   1 ASA  Blood Products:     Use of Blood Products Discussed: Yes     Consented to blood products  Anesthetic plan and risks discussed with: patient  Induction:    intravenous   Postop Plan:   Patient Disposition: phase II then home   Pain Management: IV analgesics

## 2023-06-16 NOTE — ANESTHESIA PROCEDURE NOTES
Airway  Urgency: elective    Start Time: 6/16/2023 11:36 AM  Stop Time: 6/16/2023 11:36 AM    Airway not difficult    General Information and Staff    Patient location during procedure: OR  Performed by: Teetee Hand MD  Authorized by: Teetee Hand MD      Indications and Patient Condition  Indications for airway management: anesthesia  Sedation level: deep  Preoxygenated: yes  Patient position: sniffing  Mask difficulty assessment: 1 - vent by mask    Final Airway Details  Final airway type: supraglottic airway      Successful airway: classic  Size 4     Number of attempts at approach: 1  Number of other approaches attempted: 0  Atraumatic airway insertion

## 2023-06-16 NOTE — DISCHARGE INSTRUCTIONS
DISCHARGE INSTRUCTIONS - CYSTOSCOPIC PROCEDURE     ACTIVITY:  Activity as tolerated.  Perhaps light activity for the first week after surgery.  PAIN:  You can expect to have some pain for a few days after discharge.  You can take Tylenol 650 mg every 6 hours and Motrin 400 mg every 8 hours for pain or discomfort.  Burning and bleeding with urination are common after this procedure.  It will gradually become less intense after first few times you pass your urine.  MEDICATIONS:  You may immediately resume your regular medications.  DIET AND BOWELS:  Drink plenty of fluids during the day.  Water is the best, but juices, coffee, tea are all acceptable.  The purpose is to increase the urine output enough to flush out any blood.  If needed, you may use over the counter stool softeners (such as miralax) to help your bowels get started.  WHEN TO CALL US:  Temperature of 101.0 degrees or above (fevers <101 may be normal after surgery).  Severe pain - not relieved with pain medication, especially if it is associated with nausea, vomiting, or dizziness  Pain, burning or other difficulty urinating  Unable to pass your urine  YOU HAVE A PRECTUANEOUS NEPHROURETEROSTOMY   This is a tube that extends from your kidney all the way down your bladder.  You may keep the tube capped at home.  If you experience any pain or discomfort, please place the tube to a drainage bag that has been provided for you on discharge.  You may shower and pat the drain insertion site dry.  Avoid tub baths, oceans, lakes or rivers until the drain has been removed  APPOINTMENTS:  Follow-up with Dr. Mercer on 6/20 for neck steps.  Call [phone] with any additional questions or concerns.

## 2023-06-16 NOTE — PROGRESS NOTES
Urology Daily Progress Note    Patient seen and evaluated, agree with below.  Please reference full history and physical exam from Pat 15, 2023.  Patient is a 52-year-old male with a 4 mm left UPJ stone and persistent pain.    Abdomen: Soft,+ L CVAT    52-year-old male with 4 mm distal left UVJ stone with persistent pain  Admit to urology.  Possible ureteroscopy with stone extraction later today.    Subjective   Interval History: Continues to have severe left flank pain. Has not voided a stone.     Objective     Vital signs in last 24 hours:  Temp:  [36.4 °C (97.6 °F)-37.1 °C (98.7 °F)] 37 °C (98.6 °F)  Heart Rate:  [65-72] 65  Resp:  [15-19] 15  BP: (131-152)/(77-88) 131/77      Intake/Output Summary (Last 24 hours) at 6/16/2023 0634  Last data filed at 6/16/2023 0153  Gross per 24 hour   Intake 3933.33 ml   Output 2100 ml   Net 1833.33 ml     Intake/Output this shift:  I/O this shift:  In: -   Out: 1100 [Urine:1100]    Labs  BMP Results         06/16/23 06/15/23 06/12/23     0343 1046 2058     136 138    K 4.6 4.2 4.4    Cl 107 103 104    CO2 23 27 26    Glucose 93 118 149    BUN 19 23 20    Creatinine 1.3 1.7 1.1    Calcium 8.4 9.1 9.2    Anion Gap 5 6 8    EGFR 58.2 42.7 >60.0           Comment for K at 1046 on 06/15/23: Results obtained on plasma. Plasma Potassium values may be up to 0.4 mEQ/L less than serum values. The differences may be greater for patients with high platelet or white cell counts.    Comment for K at 2058 on 06/12/23: Results obtained on plasma. Plasma Potassium values may be up to 0.4 mEQ/L less than serum values. The differences may be greater for patients with high platelet or white cell counts.          CBC Results         06/16/23 06/15/23 06/12/23     0343 1046 2058    WBC 9.59 9.88 14.64    RBC 4.37 5.14 4.91    HGB 12.5 14.5 14.1    HCT 37.8 44.5 42.9    MCV 86.5 86.6 87.4    MCH 28.6 28.2 28.7    MCHC 33.1 32.6 32.9     266 271           Comment for HGB at 0343 on  "06/16/23: ALL RESULTS HAVE BEEN CHECKED          UA Results         06/15/23     1149    Color Yellow    Clarity Clear    Glucose Negative    Bilirubin Negative    Ketones Negative    Sp Grav 1.011    Blood +1    Ph 6.0    Protein Negative    Urobilinogen 0.2    Nitrite Negative    Leuk Est Negative    WBC 0 TO 3    RBC 0 TO 4    Bacteria Rare           Comment for Blood at 1149 on 06/15/23: The sensitivity of the occult blood test is equivalent to approximately 4 intact RBC/HPF.    Comment for Leuk Est at 1149 on 06/15/23: Results can be falsely negative due to high specific gravity, some antibiotics, glucose >3 g/dl, or WBC other than neutrophils.          Microbiology Results       ** No results found for the last 720 hours. **              Physicial Exam  Visit Vitals  /77 (BP Location: Right upper arm, Patient Position: Lying)   Pulse 65   Temp 37 °C (98.6 °F) (Oral)   Resp 15   Ht 1.651 m (5' 5\")   Wt 70.8 kg (156 lb)   SpO2 96%   BMI 25.96 kg/m²     General appearance: NAD  Abdomen: soft, NT, ND  Back: L CVAT  Extremities: warm and well perfused  Neurologic: AAOx3      Assessment   52M admitted 6/15 with severe left renal colic 2/2 obstructing 4 mm L UVJ stone. Continues to endorse severe pain and has not passed his stone despite MET.        Plan   Discussed continued spontaneous TOP vs. Ureteroscopic intervention. His pain is severe despite pain medication and would like to proceed with surgery. Keep NPO. Added on for left ureteroscopy and laser lithotripsy. Risks, benefits and alternatives to surgery were discussed in detail.     Guru Galvin DO PGY-5  [facility], Chief Resident  Brock Brooks Pager #[phone]  Noel Pager #[phone]    "

## 2023-06-16 NOTE — OR SURGEON
Pre-Procedure patient identification:  I am the primary operating surgeon/proceduralist and I have reviewed the applicable pathology reports and radiology studies for this procedure. I have identified the patient and confirmed laterality is left on 06/16/23 at 6:26 PM Harvey Weber mD

## 2023-06-16 NOTE — ANESTHESIOLOGIST PRE-PROCEDURE ATTESTATION
Pre-Procedure Patient Identification:  I am the Primary Anesthesiologist and have identified the patient on 06/16/23 at 10:24 AM.   I have confirmed the procedure(s) will be performed by the following surgeon/proceduralist Evangelina Mercer MD.

## 2023-06-17 VITALS
OXYGEN SATURATION: 96 % | RESPIRATION RATE: 16 BRPM | HEART RATE: 59 BPM | SYSTOLIC BLOOD PRESSURE: 125 MMHG | TEMPERATURE: 97.8 F | BODY MASS INDEX: 26.01 KG/M2 | DIASTOLIC BLOOD PRESSURE: 76 MMHG | HEIGHT: 65 IN | WEIGHT: 156.09 LBS

## 2023-06-17 LAB
ANION GAP SERPL CALC-SCNC: 10 MEQ/L (ref 3–15)
BUN SERPL-MCNC: 20 MG/DL (ref 8–20)
CALCIUM SERPL-MCNC: 8.8 MG/DL (ref 8.9–10.3)
CHLORIDE SERPL-SCNC: 104 MEQ/L (ref 98–109)
CO2 SERPL-SCNC: 25 MEQ/L (ref 22–32)
CREAT SERPL-MCNC: 1.1 MG/DL (ref 0.8–1.3)
ERYTHROCYTE [DISTWIDTH] IN BLOOD BY AUTOMATED COUNT: 12.6 % (ref 11.6–14.4)
GFR SERPL CREATININE-BSD FRML MDRD: >60 ML/MIN/1.73M*2
GLUCOSE SERPL-MCNC: 89 MG/DL (ref 70–99)
HCT VFR BLDCO AUTO: 38.6 % (ref 40.1–51)
HGB BLD-MCNC: 12.4 G/DL (ref 13.7–17.5)
MCH RBC QN AUTO: 27.7 PG (ref 28–33.2)
MCHC RBC AUTO-ENTMCNC: 32.1 G/DL (ref 32.2–36.5)
MCV RBC AUTO: 86.2 FL (ref 83–98)
PDW BLD AUTO: 10.5 FL (ref 9.4–12.4)
PLATELET # BLD AUTO: 262 K/UL (ref 150–350)
POTASSIUM SERPL-SCNC: 3.9 MEQ/L (ref 3.6–5.1)
RBC # BLD AUTO: 4.48 M/UL (ref 4.5–5.8)
SODIUM SERPL-SCNC: 139 MEQ/L (ref 136–144)
WBC # BLD AUTO: 11.68 K/UL (ref 3.8–10.5)

## 2023-06-17 PROCEDURE — 82310 ASSAY OF CALCIUM: CPT | Performed by: UROLOGY

## 2023-06-17 PROCEDURE — 63700000 HC SELF-ADMINISTRABLE DRUG: Performed by: UROLOGY

## 2023-06-17 PROCEDURE — 85027 COMPLETE CBC AUTOMATED: CPT | Performed by: UROLOGY

## 2023-06-17 PROCEDURE — 36415 COLL VENOUS BLD VENIPUNCTURE: CPT | Performed by: UROLOGY

## 2023-06-17 RX ORDER — IBUPROFEN 200 MG
400 TABLET ORAL EVERY 8 HOURS PRN
Qty: 30 TABLET | Refills: 0 | Status: SHIPPED | OUTPATIENT
Start: 2023-06-17 | End: 2023-07-17

## 2023-06-17 RX ORDER — ACETAMINOPHEN 325 MG/1
650 TABLET ORAL EVERY 6 HOURS PRN
Qty: 30 TABLET | Refills: 0 | Status: SHIPPED | OUTPATIENT
Start: 2023-06-17

## 2023-06-17 RX ADMIN — DOCUSATE SODIUM 100 MG: 100 CAPSULE, LIQUID FILLED ORAL at 08:25

## 2023-06-17 RX ADMIN — OXYCODONE HYDROCHLORIDE 5 MG: 5 TABLET ORAL at 01:19

## 2023-06-17 RX ADMIN — ESCITALOPRAM OXALATE 10 MG: 10 TABLET ORAL at 08:25

## 2023-06-17 RX ADMIN — ACETAMINOPHEN 650 MG: 325 TABLET ORAL at 08:25

## 2023-06-17 RX ADMIN — ACETAMINOPHEN 650 MG: 325 TABLET ORAL at 03:56

## 2023-06-17 RX ADMIN — POLYETHYLENE GLYCOL 3350 17 G: 17 POWDER, FOR SOLUTION ORAL at 08:25

## 2023-06-17 ASSESSMENT — COGNITIVE AND FUNCTIONAL STATUS - GENERAL
WALKING IN HOSPITAL ROOM: 4 - NONE
MOVING TO AND FROM BED TO CHAIR: 4 - NONE
CLIMB 3 TO 5 STEPS WITH RAILING: 4 - NONE
STANDING UP FROM CHAIR USING ARMS: 4 - NONE

## 2023-06-17 NOTE — PROGRESS NOTES
Urology Daily Progress Note    Subjective   Interval History:     Patient underwent cystoscopy and attempted left ureteroscopy, laser lithotripsy of left UVJ calculus.  Left ureteral orifice unable to be identified given significant edema with obstructing stone.  He subsequently went to interventional radiology for placement of a left PCNU.  IR feels that they potentially pushed the stone into the bladder with this intervention.  However, he has been straining his urine and has not seen a stone.  His pain is well controlled and he denies persistent nausea or vomiting.    Objective     Vital signs in last 24 hours:  Temp:  [36.2 °C (97.2 °F)-36.7 °C (98.1 °F)] 36.6 °C (97.8 °F)  Heart Rate:  [] 59  Resp:  [5-18] 16  BP: (106-155)/(57-89) 125/76      Intake/Output Summary (Last 24 hours) at 6/17/2023 0839  Last data filed at 6/17/2023 0800  Gross per 24 hour   Intake 1500 ml   Output 2850 ml   Net -1350 ml     Intake/Output this shift:  I/O this shift:  In: -   Out: 300 [Urine:300]    Labs  BMP Results       06/17/23 06/16/23 06/15/23     0413 0343 1046     135 136    K 3.9 4.6 4.2    Cl 104 107 103    CO2 25 23 27    Glucose 89 93 118    BUN 20 19 23    Creatinine 1.1 1.3 1.7    Calcium 8.8 8.4 9.1    Anion Gap 10 5 6    EGFR >60.0 58.2 42.7         Comment for K at 1046 on 06/15/23: Results obtained on plasma. Plasma Potassium values may be up to 0.4 mEQ/L less than serum values. The differences may be greater for patients with high platelet or white cell counts.        CBC Results       06/17/23 06/16/23 06/15/23     0413 0343 1046    WBC 11.68 9.59 9.88    RBC 4.48 4.37 5.14    HGB 12.4 12.5 14.5    HCT 38.6 37.8 44.5    MCV 86.2 86.5 86.6    MCH 27.7 28.6 28.2    MCHC 32.1 33.1 32.6     223 266         Comment for HGB at 0343 on 06/16/23: ALL RESULTS HAVE BEEN CHECKED        UA Results       06/15/23     1149    Color Yellow    Clarity Clear    Glucose Negative    Bilirubin Negative    Ketones  "Negative    Sp Grav 1.011    Blood +1    Ph 6.0    Protein Negative    Urobilinogen 0.2    Nitrite Negative    Leuk Est Negative    WBC 0 TO 3    RBC 0 TO 4    Bacteria Rare         Comment for Blood at 1149 on 06/15/23: The sensitivity of the occult blood test is equivalent to approximately 4 intact RBC/HPF.    Comment for Leuk Est at 1149 on 06/15/23: Results can be falsely negative due to high specific gravity, some antibiotics, glucose >3 g/dl, or WBC other than neutrophils.        Microbiology Results     ** No results found for the last 720 hours. **            Physicial Exam  Visit Vitals  /76 (BP Location: Right upper arm, Patient Position: Lying)   Pulse (!) 59   Temp 36.6 °C (97.8 °F) (Oral)   Resp 16   Ht 1.651 m (5' 5\")   Wt 70.8 kg (156 lb 1.4 oz)   SpO2 96%   BMI 25.97 kg/m²     General appearance: Nontoxic-appearing, no acute distress  Abdomen: Soft, nontender, nondistended  Back: No CVA tenderness, left PCNU was capped  Extremities: Warm and well-perfused  Neurologic: Alert, awake and oriented      Assessment   52-year-old male who presented with severe left renal colic secondary to 4 mm left UVJ calculus.  He underwent cystoscopy and attempted left ureteroscopy, but the ureteral orifice was unable to be identified due to substantial trigonal edema from his obstructing stone.  Therefore, he went to interventional radiology for placement of a left PCNU.  The interventional radiologist believes he pushed the stone into the bladder with PCNU placement, but the patient has not voided his stone as of yet.        Plan   - Now that his pain is well controlled with PCNU placement, he is stable for discharge.  He has an appointment with Dr. Mercer on Tuesday, 6/20.  He can either have repeat imaging at that time or get scheduled for definitive left ureteroscopy to ensure that his stone has passed.  Since his pain is well controlled with the PCNU capped, can keep PCNU capped on discharge.  For pain he " can use Tylenol and Motrin as needed.  He should continue to strain his urine at home and if he passes a stone he should bring into the office for stone analysis.    Guru Galvin,  PGY-5  [facility], Chief Resident  Brock [facility] Pager #[phone]  [place] Pager #[phone]

## 2023-06-21 ENCOUNTER — TRANSCRIBE ORDERS (OUTPATIENT)
Dept: SCHEDULING | Age: 53
End: 2023-06-21

## 2023-06-21 DIAGNOSIS — N20.0 CALCULUS OF KIDNEY: Primary | ICD-10-CM

## 2023-06-28 ENCOUNTER — HOSPITAL ENCOUNTER (OUTPATIENT)
Dept: RADIOLOGY | Facility: CLINIC | Age: 53
Discharge: HOME | End: 2023-06-28
Attending: UROLOGY
Payer: COMMERCIAL

## 2023-06-28 DIAGNOSIS — N20.0 CALCULUS OF KIDNEY: ICD-10-CM

## 2023-06-28 PROCEDURE — 76775 US EXAM ABDO BACK WALL LIM: CPT

## 2024-06-14 ENCOUNTER — TRANSCRIBE ORDERS (OUTPATIENT)
Dept: SCHEDULING | Age: 54
End: 2024-06-14

## 2024-06-14 DIAGNOSIS — N20.0 CALCULUS OF KIDNEY: Primary | ICD-10-CM

## 2024-06-18 ENCOUNTER — HOSPITAL ENCOUNTER (OUTPATIENT)
Dept: RADIOLOGY | Facility: CLINIC | Age: 54
Discharge: HOME | End: 2024-06-18
Attending: UROLOGY
Payer: COMMERCIAL

## 2024-06-18 DIAGNOSIS — N20.0 CALCULUS OF KIDNEY: ICD-10-CM

## 2024-06-18 PROCEDURE — 76775 US EXAM ABDO BACK WALL LIM: CPT

## 2025-04-30 ENCOUNTER — TRANSCRIBE ORDERS (OUTPATIENT)
Dept: SCHEDULING | Age: 55
End: 2025-04-30

## 2025-04-30 DIAGNOSIS — N20.0 URIC ACID NEPHROLITHIASIS: Primary | ICD-10-CM

## 2025-05-20 ENCOUNTER — HOSPITAL ENCOUNTER (OUTPATIENT)
Dept: RADIOLOGY | Age: 55
Discharge: HOME | End: 2025-05-20
Attending: UROLOGY
Payer: COMMERCIAL

## 2025-05-20 DIAGNOSIS — N20.0 URIC ACID NEPHROLITHIASIS: ICD-10-CM

## 2025-05-20 PROCEDURE — 76775 US EXAM ABDO BACK WALL LIM: CPT

## 2025-08-31 ENCOUNTER — APPOINTMENT (EMERGENCY)
Dept: RADIOLOGY | Facility: HOSPITAL | Age: 55
End: 2025-08-31
Payer: COMMERCIAL

## 2025-08-31 ENCOUNTER — HOSPITAL ENCOUNTER (EMERGENCY)
Facility: HOSPITAL | Age: 55
Discharge: HOME | End: 2025-08-31
Attending: EMERGENCY MEDICINE | Admitting: EMERGENCY MEDICINE
Payer: COMMERCIAL

## 2025-08-31 VITALS
SYSTOLIC BLOOD PRESSURE: 132 MMHG | TEMPERATURE: 97.2 F | BODY MASS INDEX: 25.83 KG/M2 | OXYGEN SATURATION: 98 % | WEIGHT: 155 LBS | DIASTOLIC BLOOD PRESSURE: 60 MMHG | HEART RATE: 65 BPM | RESPIRATION RATE: 16 BRPM | HEIGHT: 65 IN

## 2025-08-31 DIAGNOSIS — R93.89 ABNORMAL CT SCAN: ICD-10-CM

## 2025-08-31 DIAGNOSIS — K52.9 COLITIS: Primary | ICD-10-CM

## 2025-08-31 LAB
ALBUMIN SERPL-MCNC: 4.2 G/DL (ref 3.5–5.7)
ALP SERPL-CCNC: 82 IU/L (ref 34–125)
ALT SERPL-CCNC: 16 IU/L (ref 7–52)
ANION GAP SERPL CALC-SCNC: 7 MEQ/L (ref 3–15)
AST SERPL-CCNC: 17 IU/L (ref 13–39)
BASOPHILS # BLD: 0.05 K/UL (ref 0.01–0.1)
BASOPHILS NFR BLD: 0.5 %
BILIRUB SERPL-MCNC: 0.6 MG/DL (ref 0.3–1.2)
BILIRUB UR QL STRIP.AUTO: NEGATIVE MG/DL
BUN SERPL-MCNC: 15 MG/DL (ref 7–25)
CALCIUM SERPL-MCNC: 8.9 MG/DL (ref 8.6–10.3)
CHLORIDE SERPL-SCNC: 104 MEQ/L (ref 98–107)
CLARITY UR REFRACT.AUTO: CLEAR
CO2 SERPL-SCNC: 25 MEQ/L (ref 21–31)
COLOR UR AUTO: YELLOW
CREAT SERPL-MCNC: 1.2 MG/DL (ref 0.7–1.3)
DIFFERENTIAL METHOD BLD: ABNORMAL
EGFRCR SERPLBLD CKD-EPI 2021: >60 ML/MIN/1.73M*2
EOSINOPHIL # BLD: 0.25 K/UL (ref 0.04–0.54)
EOSINOPHIL NFR BLD: 2.6 %
ERYTHROCYTE [DISTWIDTH] IN BLOOD BY AUTOMATED COUNT: 12.5 % (ref 11.6–14.4)
GLUCOSE SERPL-MCNC: 114 MG/DL (ref 70–99)
GLUCOSE UR STRIP.AUTO-MCNC: NEGATIVE MG/DL
HCT VFR BLD AUTO: 42.8 % (ref 40.1–51)
HGB BLD-MCNC: 13.8 G/DL (ref 13.7–17.5)
HGB UR QL STRIP.AUTO: NEGATIVE
IMM GRANULOCYTES # BLD AUTO: 0.03 K/UL (ref 0–0.08)
IMM GRANULOCYTES NFR BLD AUTO: 0.3 %
KETONES UR STRIP.AUTO-MCNC: NEGATIVE MG/DL
LEUKOCYTE ESTERASE UR QL STRIP.AUTO: NEGATIVE
LIPASE SERPL-CCNC: 17 U/L (ref 11–82)
LYMPHOCYTES # BLD: 2.12 K/UL (ref 1.2–3.5)
LYMPHOCYTES NFR BLD: 22.4 %
MCH RBC QN AUTO: 27.4 PG (ref 28–33.2)
MCHC RBC AUTO-ENTMCNC: 32.2 G/DL (ref 32.2–36.5)
MCV RBC AUTO: 85.1 FL (ref 83–98)
MONOCYTES # BLD: 0.79 K/UL (ref 0.3–1)
MONOCYTES NFR BLD: 8.4 %
NEUTROPHILS # BLD: 6.21 K/UL (ref 1.7–7)
NEUTS SEG NFR BLD: 65.8 %
NITRITE UR QL STRIP.AUTO: NEGATIVE
NRBC BLD-RTO: 0 %
PH UR STRIP.AUTO: 5.5 [PH]
PLATELET # BLD AUTO: 220 K/UL (ref 150–350)
PMV BLD AUTO: 10.5 FL (ref 9.4–12.4)
POTASSIUM SERPL-SCNC: 4.1 MEQ/L (ref 3.5–5.1)
PROT SERPL-MCNC: 6.8 G/DL (ref 6–8.2)
PROT UR QL STRIP.AUTO: NEGATIVE
RBC # BLD AUTO: 5.03 M/UL (ref 4.5–5.8)
SODIUM SERPL-SCNC: 136 MEQ/L (ref 136–145)
SP GR UR REFRACT.AUTO: 1.02
UROBILINOGEN UR STRIP-ACNC: 0.2 EU/DL
WBC # BLD AUTO: 9.45 K/UL (ref 3.8–10.5)

## 2025-08-31 PROCEDURE — 74177 CT ABD & PELVIS W/CONTRAST: CPT

## 2025-08-31 PROCEDURE — 87077 CULTURE AEROBIC IDENTIFY: CPT | Mod: 59

## 2025-08-31 PROCEDURE — 87324 CLOSTRIDIUM AG IA: CPT | Mod: 59

## 2025-08-31 PROCEDURE — 3E0337Z INTRODUCTION OF ELECTROLYTIC AND WATER BALANCE SUBSTANCE INTO PERIPHERAL VEIN, PERCUTANEOUS APPROACH: ICD-10-PCS | Performed by: EMERGENCY MEDICINE

## 2025-08-31 PROCEDURE — 96360 HYDRATION IV INFUSION INIT: CPT | Mod: 59

## 2025-08-31 PROCEDURE — 83690 ASSAY OF LIPASE: CPT

## 2025-08-31 PROCEDURE — 36415 COLL VENOUS BLD VENIPUNCTURE: CPT

## 2025-08-31 PROCEDURE — 87493 C DIFF AMPLIFIED PROBE: CPT

## 2025-08-31 PROCEDURE — 63700000 HC SELF-ADMINISTRABLE DRUG

## 2025-08-31 PROCEDURE — 63600105 HC IODINE BASED CONTRAST: Mod: JZ

## 2025-08-31 PROCEDURE — 85025 COMPLETE CBC W/AUTO DIFF WBC: CPT | Performed by: EMERGENCY MEDICINE

## 2025-08-31 PROCEDURE — 80053 COMPREHEN METABOLIC PANEL: CPT

## 2025-08-31 PROCEDURE — 85025 COMPLETE CBC W/AUTO DIFF WBC: CPT

## 2025-08-31 PROCEDURE — 25800000 HC PHARMACY IV SOLUTIONS

## 2025-08-31 PROCEDURE — 99284 EMERGENCY DEPT VISIT MOD MDM: CPT | Mod: 25

## 2025-08-31 PROCEDURE — 80053 COMPREHEN METABOLIC PANEL: CPT | Performed by: EMERGENCY MEDICINE

## 2025-08-31 PROCEDURE — 96361 HYDRATE IV INFUSION ADD-ON: CPT

## 2025-08-31 PROCEDURE — 81003 URINALYSIS AUTO W/O SCOPE: CPT

## 2025-08-31 RX ORDER — VANCOMYCIN HYDROCHLORIDE 125 MG/1
125 CAPSULE ORAL
Status: DISCONTINUED | OUTPATIENT
Start: 2025-08-31 | End: 2025-08-31 | Stop reason: HOSPADM

## 2025-08-31 RX ORDER — VANCOMYCIN HYDROCHLORIDE 125 MG/1
125 CAPSULE ORAL 4 TIMES DAILY
Qty: 40 CAPSULE | Refills: 0 | Status: SHIPPED | OUTPATIENT
Start: 2025-08-31 | End: 2025-08-31

## 2025-08-31 RX ORDER — IOPAMIDOL 755 MG/ML
100 INJECTION, SOLUTION INTRAVASCULAR
Status: COMPLETED | OUTPATIENT
Start: 2025-08-31 | End: 2025-08-31

## 2025-08-31 RX ORDER — VANCOMYCIN HYDROCHLORIDE 125 MG/1
125 CAPSULE ORAL 4 TIMES DAILY
Qty: 40 CAPSULE | Refills: 0 | Status: SHIPPED | OUTPATIENT
Start: 2025-08-31 | End: 2025-09-10

## 2025-08-31 RX ADMIN — IOPAMIDOL 100 ML: 755 INJECTION, SOLUTION INTRAVENOUS at 18:05

## 2025-08-31 RX ADMIN — SODIUM CHLORIDE 1000 ML: 9 INJECTION, SOLUTION INTRAVENOUS at 16:28

## 2025-08-31 RX ADMIN — VANCOMYCIN HYDROCHLORIDE 125 MG: 125 CAPSULE ORAL at 19:14

## 2025-08-31 ASSESSMENT — ENCOUNTER SYMPTOMS
BLOOD IN STOOL: 0
CHILLS: 0
ABDOMINAL DISTENTION: 1
NAUSEA: 0
DYSURIA: 0
VOMITING: 0
HEMATURIA: 0
DIARRHEA: 1
SHORTNESS OF BREATH: 0
ABDOMINAL PAIN: 1
FREQUENCY: 0
FEVER: 0

## 2025-09-01 LAB
C DIFF TOX A+B STL QL IA: NEGATIVE
C DIFF TOX GENS STL QL NAA+PROBE: POSITIVE
C DIFFICILE INTERPRETATION COMMENT: NORMAL
LABORATORY COMMENT REPORT: ABNORMAL

## 2025-09-02 LAB — BACTERIA STL CULT: NORMAL

## (undated) DEVICE — BULB PATHFINDER PLUS

## (undated) DEVICE — COVER MAYO STAND ST 30/CS

## (undated) DEVICE — PACK RFID CYSTOSCOPY

## (undated) DEVICE — DILATOR URETHRAL 8-20FR L37CM WITH SIDEPORT SET S-CURVE

## (undated) DEVICE — SOLN IRRIG .9%SOD 500ML

## (undated) DEVICE — CATHETER URETERAL L50CM OD5FR .040IN STANDARD POLYURETHANE 2

## (undated) DEVICE — GUIDEWIRE TEFLON COATED 0.38MM

## (undated) DEVICE — CATH URETERAL 5FR 70CM

## (undated) DEVICE — CHECK-FLOW ADAPTOR

## (undated) DEVICE — TOWEL SURGICAL W17XL27IN BLUE COTTON STANDARD PREWASHED DELI

## (undated) DEVICE — WIRE GUIDE SENSOR DUAL FLEX .038

## (undated) DEVICE — GUIDEWIRE UROLOGICAL L150CM DIA0.038IN DISTAL TIP 3CM FLEXIB

## (undated) DEVICE — HEEL  ELBOW PROTECTOR

## (undated) DEVICE — GOWN SIRUS FABRNF RAGLAN XL ST 28/CS

## (undated) DEVICE — SOLN IRRIG .9%SOD 1000ML

## (undated) DEVICE — FIBER LASER DIA273UM RED CONNECTOR HOLMIUM W/SMARTSYNC TECHN

## (undated) DEVICE — FORCEPS CUP BIOPSY

## (undated) DEVICE — STONE EXTRACTOR NCIRCLE 2.4FR

## (undated) DEVICE — GUIDEWIRE ENDOSCOPIC L150CM DI

## (undated) DEVICE — WIRE GUIDE TFE .038 X 145 X 3CM

## (undated) DEVICE — SOLN IRRIG STER WATER 500ML

## (undated) DEVICE — SLEEVE SCD COMFORT KNEE LENGTH MED

## (undated) DEVICE — GLOVE SZ 6.5 PROTEXIS PI

## (undated) DEVICE — CATH DUAL LUMEN 6FR

## (undated) DEVICE — CATHETER URETERAL 5FR L70CM POLYVINYLCHLORIDE OPEN ENDED TIP